# Patient Record
Sex: MALE | Race: BLACK OR AFRICAN AMERICAN
[De-identification: names, ages, dates, MRNs, and addresses within clinical notes are randomized per-mention and may not be internally consistent; named-entity substitution may affect disease eponyms.]

---

## 2019-01-10 ENCOUNTER — HOSPITAL ENCOUNTER (EMERGENCY)
Dept: HOSPITAL 74 - JER | Age: 69
Discharge: HOME | End: 2019-01-10
Payer: COMMERCIAL

## 2019-01-10 VITALS — DIASTOLIC BLOOD PRESSURE: 85 MMHG | HEART RATE: 78 BPM | TEMPERATURE: 97.9 F | SYSTOLIC BLOOD PRESSURE: 158 MMHG

## 2019-01-10 VITALS — BODY MASS INDEX: 24 KG/M2

## 2019-01-10 DIAGNOSIS — Z86.73: ICD-10-CM

## 2019-01-10 DIAGNOSIS — I10: ICD-10-CM

## 2019-01-10 DIAGNOSIS — N20.0: Primary | ICD-10-CM

## 2019-01-10 LAB
ALBUMIN SERPL-MCNC: 4.4 G/DL (ref 3.4–5)
ALP SERPL-CCNC: 126 U/L (ref 45–117)
ALT SERPL-CCNC: 19 U/L (ref 13–61)
ANION GAP SERPL CALC-SCNC: 4 MMOL/L (ref 8–16)
APPEARANCE UR: CLEAR
AST SERPL-CCNC: 19 U/L (ref 15–37)
BASOPHILS # BLD: 0.9 % (ref 0–2)
BILIRUB SERPL-MCNC: 0.3 MG/DL (ref 0.2–1)
BILIRUB UR STRIP.AUTO-MCNC: NEGATIVE MG/DL
BUN SERPL-MCNC: 12 MG/DL (ref 7–18)
CALCIUM SERPL-MCNC: 9.3 MG/DL (ref 8.5–10.1)
CHLORIDE SERPL-SCNC: 104 MMOL/L (ref 98–107)
CO2 SERPL-SCNC: 28 MMOL/L (ref 21–32)
COLOR UR: COLORLESS
CREAT SERPL-MCNC: 1.1 MG/DL (ref 0.55–1.3)
DEPRECATED RDW RBC AUTO: 17.3 % (ref 11.9–15.9)
EOSINOPHIL # BLD: 2.5 % (ref 0–4.5)
GLUCOSE SERPL-MCNC: 125 MG/DL (ref 74–106)
HCT VFR BLD CALC: 48.6 % (ref 35.4–49)
HGB BLD-MCNC: 15.4 GM/DL (ref 11.7–16.9)
KETONES UR QL STRIP: NEGATIVE
LEUKOCYTE ESTERASE UR QL STRIP.AUTO: NEGATIVE
LIPASE SERPL-CCNC: 74 U/L (ref 73–393)
LYMPHOCYTES # BLD: 11.7 % (ref 8–40)
MCH RBC QN AUTO: 27.2 PG (ref 25.7–33.7)
MCHC RBC AUTO-ENTMCNC: 31.7 G/DL (ref 32–35.9)
MCV RBC: 85.7 FL (ref 80–96)
MONOCYTES # BLD AUTO: 6.7 % (ref 3.8–10.2)
NEUTROPHILS # BLD: 78.2 % (ref 42.8–82.8)
NITRITE UR QL STRIP: NEGATIVE
PH UR: 7 [PH] (ref 5–8)
PLATELET # BLD AUTO: 248 K/MM3 (ref 134–434)
PMV BLD: 8.3 FL (ref 7.5–11.1)
POTASSIUM SERPLBLD-SCNC: 3.7 MMOL/L (ref 3.5–5.1)
PROT SERPL-MCNC: 8.6 G/DL (ref 6.4–8.2)
PROT UR QL STRIP: NEGATIVE
PROT UR QL STRIP: NEGATIVE
RBC # BLD AUTO: 5.67 M/MM3 (ref 4–5.6)
SODIUM SERPL-SCNC: 135 MMOL/L (ref 136–145)
SP GR UR: 1.03 (ref 1.01–1.03)
UROBILINOGEN UR STRIP-MCNC: NEGATIVE MG/DL (ref 0.2–1)
WBC # BLD AUTO: 12.6 K/MM3 (ref 4–10)

## 2019-01-10 PROCEDURE — 3E0337Z INTRODUCTION OF ELECTROLYTIC AND WATER BALANCE SUBSTANCE INTO PERIPHERAL VEIN, PERCUTANEOUS APPROACH: ICD-10-PCS | Performed by: EMERGENCY MEDICINE

## 2019-01-10 PROCEDURE — 3E033NZ INTRODUCTION OF ANALGESICS, HYPNOTICS, SEDATIVES INTO PERIPHERAL VEIN, PERCUTANEOUS APPROACH: ICD-10-PCS | Performed by: EMERGENCY MEDICINE

## 2019-01-10 PROCEDURE — 3E033GC INTRODUCTION OF OTHER THERAPEUTIC SUBSTANCE INTO PERIPHERAL VEIN, PERCUTANEOUS APPROACH: ICD-10-PCS | Performed by: EMERGENCY MEDICINE

## 2019-01-10 PROCEDURE — 3E0333Z INTRODUCTION OF ANTI-INFLAMMATORY INTO PERIPHERAL VEIN, PERCUTANEOUS APPROACH: ICD-10-PCS | Performed by: EMERGENCY MEDICINE

## 2019-01-10 NOTE — PDOC
History of Present Illness





- General


Stated Complaint: ABDOMINAL PAIN


Time Seen by Provider: 01/10/19 07:07





- History of Present Illness


Initial Comments: 





01/10/19 07:12


Mr. López is a 69 yo male w/ pmh of HTN, prior CVA, s/p appendectomy and 

cholecystectomy who presents for evaluation of 2-3 day history of intense 

abdominal pain. Patient reports he had hiccupping several days ago which was 

relieved by nexium OTC however he stopped taking nexium after his hiccups 

resolved. He cannot localize his pain to anywhere specific and does not endorse 

any relation to food, activity, bathroom habits, etc. Denies other symptoms at 

this time.





The patient denies chest pain, shortness of breath, headache and dizziness. 

Denies fever, chills, nausea, vomit, diarrhea and constipation. Denies dysuria, 

frequency, urgency and hematuria. 





Past History





- Past Medical History


Allergies/Adverse Reactions: 


 Allergies











Allergy/AdvReac Type Severity Reaction Status Date / Time


 


No Known Allergies Allergy   Verified 01/10/19 07:17











Home Medications: 


Ambulatory Orders





Amlodipine Besylate [Norvasc -] 10 mg PO DAILY 01/10/19 


Diclofenac Potassium 50 mg PO ASDIR 01/10/19 


Fluticasone Furoate [Children's Flonase Sensimist] 5.9 ml NS ASDIR 01/10/19 


Loratadine 10 mg PO ASDIR 01/10/19 


Omeprazole 20 mg PO ASDIR 01/10/19 


Oxybutynin Chloride [Ditropan -] 5 mg PO DAILY 01/10/19 


Selenium 25 mcg PO ASDIR 01/10/19 


Tamsulosin HCl [Flomax] 0.4 mg PO DAILY #7 cap.er.24h 01/10/19 


Tramadol HCl 50 mg PO BID #4 tablet MDD 2 01/10/19 








CVA: Yes


HTN: Yes





- Suicide/Smoking/Psychosocial Hx


Smoking History: Current every day smoker


Number of Cigarettes Smoked Daily: 20


'Breaking Loose' booklet given: 09/13/15


Hx Alcohol Use: No


Drug/Substance Use Hx: No





**Review of Systems





- Review of Systems


Comments:: 





01/10/19 07:14


GENERAL/CONSTITUTIONAL: No fever or chills. No weakness.


HEAD, EYES, EARS, NOSE AND THROAT: No change in vision. No ear pain or 

discharge. No sore throat.


CARDIOVASCULAR: No chest pain or shortness of breath


RESPIRATORY: No cough, wheezing, or hemoptysis.


GASTROINTESTINAL: +Abdominal pain as described. No nausea, vomiting, diarrhea 

or constipation.


GENITOURINARY: No dysuria, frequency, or change in urination.


MUSCULOSKELETAL: No joint or muscle swelling or pain. No neck or back pain.


SKIN: No rash


NEUROLOGIC: No headache, vertigo, loss of consciousness, or change in strength/

sensation.


ENDOCRINE: No increased thirst. No abnormal weight change


HEMATOLOGIC/LYMPHATIC: No anemia, easy bleeding, or history of blood clots.


ALLERGIC/IMMUNOLOGIC: No hives or skin allergy.





*Physical Exam





- Physical Exam


Comments: 





01/10/19 07:14


GENERAL: Awake, alert, and fully oriented, in no acute distress


HEAD: No signs of trauma, normocephalic, atraumatic 


EYES: PERRLA, EOMI, sclera anicteric, conjunctiva clear


ENT: Auricles normal inspection, hearing grossly normal, nares patent, 

oropharynx clear without


exudates. Moist mucosa


NECK: Normal ROM, supple, no lymphadenopathy, JVD, or masses


LUNGS: No distress, speaks full sentences, clear to auscultation bilaterally 


HEART: Regular rate and rhythm, normal S1 and S2, no murmurs, rubs or gallops, 

peripheral pulses normal and equal bilaterally. 


ABDOMEN: +Diffuse abdominal TTP. Patient guarding. Normoactive bowel sounds. No 

rebound. No masses.


EXTREMITIES: Normal inspection, Normal range of motion, no edema. No clubbing 

or cyanosis. 


NEUROLOGICAL: Cranial nerves II through XII grossly intact. Normal speech, 

normal gait, no focal sensorimotor deficits 


SKIN: Warm, Dry, normal turgor, no rashes or lesions noted. 








ED Treatment Course





- LABORATORY


CBC & Chemistry Diagram: 


 01/10/19 08:10





 01/10/19 08:10





Medical Decision Making





- Medical Decision Making





01/10/19 10:12


Mr. López is a 69 yo male w/ pmh as described who presents for evaluation of 

diffuse abdominal pain. Patient evaluated using labs as well as CT abdomen/

pelvis using IV/Oral contrast. Patient noted to have partially obstructing mid 

right ureteral calculus with mild hydronephrosis, also heterogeneous pancreatic 

masses suspicious for malignancy.





01/10/19 11:46


Discussed CT with patient - pancreatic mass known and followed by [facility] physician 

at the [facility]. Patient pain improved after toradol. Discharging patient for 

outpatient treatment of kidney stone and urology follow-up. Flomax Rx sent to 

patient's pharmacy. Patient will contact urologist through [facility] after discharge. 





 Laboratory Results - last 24 hr











  01/10/19 01/10/19 01/10/19





  08:10 08:10 08:10


 


WBC  12.6 H  


 


RBC  5.67 H  


 


Hgb  15.4  


 


Hct  48.6  


 


MCV  85.7  


 


MCH  27.2  


 


MCHC  31.7 L  


 


RDW  17.3 H  


 


Plt Count  248  D  


 


MPV  8.3  


 


Absolute Neuts (auto)  9.8 H  


 


Neutrophils %  78.2  D  


 


Lymphocytes %  11.7  D  


 


Monocytes %  6.7  


 


Eosinophils %  2.5  


 


Basophils %  0.9  


 


Nucleated RBC %  0  


 


Platelet Comment  No clumping noted  


 


Sodium   135 L 


 


Potassium   3.7 


 


Chloride   104 


 


Carbon Dioxide   28 


 


Anion Gap   4 L 


 


BUN   12 


 


Creatinine   1.1 


 


Creat Clearance w eGFR   > 60 


 


Random Glucose   125 H 


 


Lactic Acid    1.3


 


Calcium   9.3 


 


Total Bilirubin   0.3 


 


AST   19 


 


ALT   19 


 


Alkaline Phosphatase   126 H 


 


Total Protein   8.6 H 


 


Albumin   4.4 


 


Lipase   74 


 


Urine Color   


 


Urine Appearance   


 


Urine pH   


 


Ur Specific Gravity   


 


Urine Protein   


 


Urine Glucose (UA)   


 


Urine Ketones   


 


Urine Blood   


 


Urine Nitrite   


 


Urine Bilirubin   


 


Urine Urobilinogen   


 


Ur Leukocyte Esterase   


 


Urine WBC (Auto)   


 


Urine RBC (Auto)   














  01/10/19





  10:34


 


WBC 


 


RBC 


 


Hgb 


 


Hct 


 


MCV 


 


MCH 


 


MCHC 


 


RDW 


 


Plt Count 


 


MPV 


 


Absolute Neuts (auto) 


 


Neutrophils % 


 


Lymphocytes % 


 


Monocytes % 


 


Eosinophils % 


 


Basophils % 


 


Nucleated RBC % 


 


Platelet Comment 


 


Sodium 


 


Potassium 


 


Chloride 


 


Carbon Dioxide 


 


Anion Gap 


 


BUN 


 


Creatinine 


 


Creat Clearance w eGFR 


 


Random Glucose 


 


Lactic Acid 


 


Calcium 


 


Total Bilirubin 


 


AST 


 


ALT 


 


Alkaline Phosphatase 


 


Total Protein 


 


Albumin 


 


Lipase 


 


Urine Color  Colorless


 


Urine Appearance  Clear


 


Urine pH  7.0  D


 


Ur Specific Gravity  1.028


 


Urine Protein  Negative


 


Urine Glucose (UA)  Negative


 


Urine Ketones  Negative


 


Urine Blood  1+ H


 


Urine Nitrite  Negative


 


Urine Bilirubin  Negative


 


Urine Urobilinogen  Negative


 


Ur Leukocyte Esterase  Negative


 


Urine WBC (Auto)  2


 


Urine RBC (Auto)  2














*DC/Admit/Observation/Transfer


Diagnosis at time of Disposition: 


 Kidney stone








- Discharge Dispostion


Disposition: HOME


Condition at time of disposition: Fair





- Prescriptions


Prescriptions: 


Tamsulosin HCl [Flomax] 0.4 mg PO DAILY #7 cap.er.24h





- Referrals


Referrals: 


Soco Lyons MD [Primary Care Provider] - 





- Patient Instructions


Printed Discharge Instructions:  [facility] for Kidney Stones


Additional Instructions: 


You were evaluated today in the emergency room for your pain and found to have 

a kidney stone. A proscription was sent to your pharmacy. Please take all 

medications as proscribed. No other concerning findings were found at this 

time. Please follow-up with urology as discussed. Return to ER if any fever, 

chills, increase in pain, or other concerning symptoms.





- Post Discharge Activity

## 2019-01-10 NOTE — PDOC
Attending Attestation





- HPI


HPI: 





01/10/19 09:23


The patient is a 68 year old male, with a significant PMH of hypertension, 

prior CVA, who presents to the emergency department with 2-3 days of 

generalized abdominal pain. The patient states he is unable to localize the 

abdominal pain and denies any exacerbating or remitting factors. The patient 

also notes he has had hiccups for several days for which he took OTC Nexium 

with relief of his hiccups. 





The patient denies chest pain, shortness of breath, headache and dizziness.


Denies fever, chills, nausea, vomit, diarrhea and constipation.


Denies dysuria, frequency, urgency and hematuria.





Allergies: NKA


Past surgical history: appendectomy and cholecystectomy 


Social history: No reported


PCP: Dr Soco Lyons 





Documentation prepared by Taiwo Price, acting as medical scribe for Grayson Waters MD.





- Physicial Exam


PE: 





01/10/19 11:12


Vitals: Triage Vital signs reviewed


General Appearance:  no acute distress, well nourished well developed,


Neck:  Supple;No Nuchal rigidity


Chest Wall: Nontender


Cardiac: Regular rate and rhythm, no murmurs, no rubs, no gallops,


Lungs: Clear to auscultation bilateral, good air movement bilaterally,


Abdomen:  (+) Diffuse abdominal pain. No rebound or guarding. Soft, nondistended

, normal bowel sounds.


Rectal: Exam deferred


Extremities: Full range of motion to all extremities, no cyanosis, clubbing, or 

edema


Skin:  Warm and dry, no rashes or lesions, no petechiae


Psych:  normal mood, normal affect








<Taiwo Price - Last Filed: 01/10/19 11:12>





- Resident


Resident Name: Donald Aldridge





- ED Attending Attestation


I have performed the following: I have examined & evaluated the patient, The 

case was reviewed & discussed with the resident, I agree w/resident's findings 

& plan, Exceptions are as noted





- Medical Decision Making





01/10/19 14:30





CT with evidence of right-sided kidney stone





Status post Toradol patient feels much better





Urinalysis clean labs notable for slightly elevated leukocytosis below 

suspicion for infected stone at this time





Patient has urologist we'll discharge her on Aleve tramadol Flomax with urology 

follow-up





Findings, need for follow-up and strict return instructions discussed with 

patient.








<Grayson Waters - Last Filed: 01/10/19 14:30>

## 2019-01-12 ENCOUNTER — HOSPITAL ENCOUNTER (INPATIENT)
Dept: HOSPITAL 74 - JER | Age: 69
LOS: 12 days | Discharge: HOME | DRG: 660 | End: 2019-01-24
Attending: INTERNAL MEDICINE | Admitting: INTERNAL MEDICINE
Payer: COMMERCIAL

## 2019-01-12 VITALS — BODY MASS INDEX: 24.3 KG/M2

## 2019-01-12 DIAGNOSIS — I10: ICD-10-CM

## 2019-01-12 DIAGNOSIS — N17.9: ICD-10-CM

## 2019-01-12 DIAGNOSIS — R78.81: ICD-10-CM

## 2019-01-12 DIAGNOSIS — Z86.73: ICD-10-CM

## 2019-01-12 DIAGNOSIS — K86.89: ICD-10-CM

## 2019-01-12 DIAGNOSIS — N28.1: ICD-10-CM

## 2019-01-12 DIAGNOSIS — N13.2: Primary | ICD-10-CM

## 2019-01-12 DIAGNOSIS — B95.7: ICD-10-CM

## 2019-01-12 DIAGNOSIS — N39.0: ICD-10-CM

## 2019-01-12 DIAGNOSIS — K59.00: ICD-10-CM

## 2019-01-12 LAB
ALBUMIN SERPL-MCNC: 2.9 G/DL (ref 3.4–5)
ALP SERPL-CCNC: 95 U/L (ref 45–117)
ALT SERPL-CCNC: 28 U/L (ref 13–61)
AMYLASE SERPL-CCNC: 63 U/L (ref 25–115)
ANION GAP SERPL CALC-SCNC: 12 MMOL/L (ref 8–16)
APPEARANCE UR: (no result)
AST SERPL-CCNC: 45 U/L (ref 15–37)
BACTERIA #/AREA URNS HPF: (no result) /HPF
BASOPHILS # BLD: 0.3 % (ref 0–2)
BILIRUB SERPL-MCNC: 0.9 MG/DL (ref 0.2–1)
BILIRUB UR STRIP.AUTO-MCNC: NEGATIVE MG/DL
BUN SERPL-MCNC: 28 MG/DL (ref 7–18)
CALCIUM SERPL-MCNC: 8.1 MG/DL (ref 8.5–10.1)
CHLORIDE SERPL-SCNC: 106 MMOL/L (ref 98–107)
CO2 SERPL-SCNC: 20 MMOL/L (ref 21–32)
COLOR UR: YELLOW
CREAT SERPL-MCNC: 2 MG/DL (ref 0.55–1.3)
DEPRECATED RDW RBC AUTO: 17.3 % (ref 11.9–15.9)
EOSINOPHIL # BLD: 0.1 % (ref 0–4.5)
EPITH CASTS URNS QL MICRO: (no result) /HPF
GLUCOSE SERPL-MCNC: 147 MG/DL (ref 74–106)
HCT VFR BLD CALC: 43.9 % (ref 35.4–49)
HGB BLD-MCNC: 15.3 GM/DL (ref 11.7–16.9)
INR BLD: 1.38 (ref 0.83–1.09)
KETONES UR QL STRIP: (no result)
LEUKOCYTE ESTERASE UR QL STRIP.AUTO: (no result)
LIPASE SERPL-CCNC: 36 U/L (ref 73–393)
LYMPHOCYTES # BLD: 2.2 % (ref 8–40)
MCH RBC QN AUTO: 29.2 PG (ref 25.7–33.7)
MCHC RBC AUTO-ENTMCNC: 34.8 G/DL (ref 32–35.9)
MCV RBC: 84 FL (ref 80–96)
MONOCYTES # BLD AUTO: 4.2 % (ref 3.8–10.2)
MUCOUS THREADS URNS QL MICRO: (no result)
NEUTROPHILS # BLD: 93.2 % (ref 42.8–82.8)
NITRITE UR QL STRIP: NEGATIVE
PH UR: 5 [PH] (ref 5–8)
PLATELET # BLD AUTO: 156 K/MM3 (ref 134–434)
PLATELET BLD QL SMEAR: ADEQUATE
PMV BLD: 8.8 FL (ref 7.5–11.1)
POTASSIUM SERPLBLD-SCNC: 4 MMOL/L (ref 3.5–5.1)
PROT SERPL-MCNC: 6.1 G/DL (ref 6.4–8.2)
PROT UR QL STRIP: (no result)
PROT UR QL STRIP: NEGATIVE
PT PNL PPP: 16.3 SEC (ref 9.7–13)
RBC # BLD AUTO: 5.23 M/MM3 (ref 4–5.6)
SODIUM SERPL-SCNC: 138 MMOL/L (ref 136–145)
SP GR UR: 1.02 (ref 1.01–1.03)
UROBILINOGEN UR STRIP-MCNC: 2 MG/DL (ref 0.2–1)
WBC # BLD AUTO: 17.3 K/MM3 (ref 4–10)

## 2019-01-12 PROCEDURE — G0480 DRUG TEST DEF 1-7 CLASSES: HCPCS

## 2019-01-12 PROCEDURE — C1751 CATH, INF, PER/CENT/MIDLINE: HCPCS

## 2019-01-12 NOTE — PDOC
Attending Attestation





- HPI


HPI: 





01/12/19 16:40


The patient is a 68 year old male with a significant past medical history of 

hypertension and CVA who presents to the emergency department altered mental 

since earlier today. The patient was seen in the ED 1 day ago for abdominal 

pain by which he was diagnosed with a right sided stone and sent home with 

medication. The patient reports worsened pain today and a fever. As per family 

at bedside, the patient did not know what article of clothing he had and he did 

not know his birthday. Denies any other symptoms or complaints. 





Documentation prepared by Kacy Carty, acting as medical scribe for Elvia Golden MD





- Physicial Exam


PE: 





01/12/19 17:01


GENERAL:(+)confused. The patient is in no acute distress.


HEAD: Normal with no signs of trauma.


EYES: PERRLA, EOMI, sclera anicteric, conjunctiva clear.


ENT: Ears normal, nares patent, oropharynx clear without exudates.


Moist mucous membranes.


NECK: Normal range of motion, supple without lymphadenopathy, JVD, or masses.


LUNGS: Breath sounds equal, clear to auscultation bilaterally. No


wheezes, and no crackles.


HEART:(+)tachy. Regular rhythm, normal S1 and S2 without murmur, rub or gallop.


ABDOMEN:(+)right sided abdominal tenderness.  Soft, nondistended. normoactive 

bowel sounds. No guarding, no


rebound. No masses palpable.


EXTREMITIES: Normal range of motion, no edema. No clubbing or


cyanosis. No erythema, or tenderness.


NEUROLOGICAL: Cranial nerves II through XII grossly intact. Normal


speech. No focal neurological deficits.


MUSCULOSKELETAL: Back non-tender to palpation, no CVA tenderness


SKIN: Warm, Dry, normal turgor, no rashes or lesions noted.








<Kacy Carty - Last Filed: 01/12/19 17:01>





- Resident


Resident Name: Lena Fry





- ED Attending Attestation


I have performed the following: I have examined & evaluated the patient, The 

case was reviewed & discussed with the resident, I agree w/resident's findings 

& plan, Exceptions are as noted





- Medical Decision Making





01/12/19 16:39


EKG - NSR rate of 132 bpm, axis nml, no st elevations or depressions, intervals 

nml, t waves upright





01/12/19 17:05


Rectal temp 105.6


Sepsis order set initiated


Fluids ordered per sepsis order set 





Labs pending 


UA pending


Will admit


Will consult Dr Chavez





01/12/19 17:17


Case reviewed with Dr Chavez


He requests imipenam


call placed to Dr. Leigha zambrano


His phone is off


Will contact the service again





Will also consult 








01/12/19 17:22


 Laboratory Tests











  01/10/19 01/12/19





  08:10 16:30


 


WBC  12.6 H  17.3 H


 


Hgb  15.4  15.3


 


Hct  48.6  43.9


 


Plt Count  248  D  156  D











01/12/19 18:38


 Laboratory Tests











  01/12/19





  16:30


 


Lactic Acid  2.8 H*











01/12/19 19:52


 Laboratory Tests











  01/10/19 01/12/19 01/12/19





  08:10 16:30 17:56


 


Sodium  135 L  Cancelled  138


 


Potassium  3.7   4.0


 


Chloride  104   106


 


Carbon Dioxide  28   20 L


 


Anion Gap    12


 


BUN  12   28 H


 


Creatinine  1.1  Cancelled  2.0 H


 


Random Glucose  125 H   147 H


 


Creatine Kinase    231


 


Troponin I    < 0.02


 


Total Amylase    63


 


Lipase    36 L








Jeronimo scott





Case reviewed with Dr Jeronimo Mcnulty


He will be taking this patient to the OR tomorrow


Pt briefly became hypotensive


Receiving sepsis bolus 


Call placed to ICU 


They have evaluated this patient


At this time, he is not a candidate for ICU at this time





Clinical impression:


OBSTRUCTING STONE


UTI





<Elvia Golden - Last Filed: 01/12/19 21:34>

## 2019-01-12 NOTE — PDOC
History of Present Illness





- General


Chief Complaint: Altered Mental Status


Stated Complaint: KIDNEY PAIN


Time Seen by Provider: 01/12/19 15:54


History Source: Patient





- History of Present Illness


Initial Comments: 





01/12/19 17:03


The patient is 68 year old male with a PMH of HTN, remote h/o CVA (20+ years 

previous w/no residual deficits), pancreatic mass (s/p biopsy [facility], no known 

malignancy) presents to our ED c/o fever, abdominal pain.  Patient's wife @ 

bedside assists in history. Patient states he was evaluated yesterday in our ED 

for abdominal pain and CT showed kidney stone.  Was discharged home with 

supportive care.  Pain worsened overnight and today.  Patient's wife noticed he 

felt warm to touch and decided to come to the ED for further evaluation.  

Patient states the pain is diffuse, R>L, 10/10, "sharp," radiates to his R 

flank.  Minimal relief from Tramadol which was prescribed from ED evaluation 

yesterday.  Is tolerating PO intake, however notes decreased appetite 2/2 to 

symptoms. Last BM was prior to presentation and was normal.  Patient's wife 

notes that patient couldn't remember his birthdate @ triage and was confused @ 

home looking for his hat while it was on his head.  At baseline patient is alert

, oriented x3.  





10 ROS was negative including no chest pain/shortness of breath/abdominal pain/

nausea/vomiting/diarrhea/constipation.





NKDA


Surgical: appendectomy, cholecystectomy


Social: lifetime non-smoker


PMD[facility] Soco [facility] 








As per EMR, patient was evaluated in our ED on 1/10 @ which time CT w/IV 

contrast showed 5x7 mm R mid ureter calculus, partial obstruction, mild R sided 

hydronephrosis, heterogenous pancreatic mass w/duct dilatation.  Cr stable @ 1.1

, UA showed 1+ blood c/w nephrolithiasis, no LE, normal WBC.  

















Past History





- Past Medical History


Allergies/Adverse Reactions: 


 Allergies











Allergy/AdvReac Type Severity Reaction Status Date / Time


 


No Known Allergies Allergy   Verified 01/10/19 07:17











Home Medications: 


Ambulatory Orders





Amlodipine Besylate [Norvasc -] 10 mg PO DAILY 01/10/19 


Diclofenac Potassium 50 mg PO ASDIR 01/10/19 


Fluticasone Furoate [Children's Flonase Sensimist] 5.9 ml NS ASDIR 01/10/19 


Loratadine 10 mg PO ASDIR 01/10/19 


Omeprazole 20 mg PO ASDIR 01/10/19 


Oxybutynin Chloride [Ditropan -] 5 mg PO DAILY 01/10/19 


Selenium 25 mcg PO ASDIR 01/10/19 


Tamsulosin HCl [Flomax] 0.4 mg PO DAILY #7 cap.er.24h 01/10/19 


Tramadol HCl 50 mg PO BID #4 tablet MDD 2 01/10/19 








CVA: Yes


COPD: No


HTN: Yes





- Immunization History


Immunization Up to Date: Yes





- Suicide/Smoking/Psychosocial Hx


Smoking History: Current every day smoker


Have you smoked in the past 12 months: No


Number of Cigarettes Smoked Daily: 10


Information on smoking cessation initiated: No


'Breaking Loose' booklet given: 09/13/15


Hx Alcohol Use: No


Drug/Substance Use Hx: No





**Review of Systems





- Review of Systems


Constitutional: Yes: Chills, Fever


HEENTM: No: Recent change in vision


Respiratory: No: Cough, Shortness of Breath


Cardiac (ROS): No: Chest Pain, Lightheadedness, Palpitations, Syncope


ABD/GI: Yes: Abdominal cramping.  No: Constipated, Diarrhea, Nausea, Vomiting


: No: Burning, Dysuria





*Physical Exam





- Vital Signs


 Last Vital Signs











Temp Pulse Resp BP Pulse Ox


 


 100.2 F H  147 H  20   144/95   98 


 


 01/12/19 15:47  01/12/19 15:47  01/12/19 15:47  01/12/19 15:47  01/12/19 15:47














- Physical Exam


General Appearance: Yes: Nourished, Appropriately Dressed


HEENT: positive: Normal Voice, Hearing Grossly Normal


Neck: positive: Trachea midline, Supple


Respiratory/Chest: positive: Lungs Clear, Normal Breath Sounds


Cardiovascular: positive: S1, S2, Tachycardia


Vascular Pulses: Dorsalis-Pedis (R): 2+, Doralis-Pedis (L): 2+


Gastrointestinal/Abdominal: positive: Soft, Other (Mild RUQ TTP, (+) bowel 

sounds, no peritoneal signs)


Male Genitalia: positive: CVAT (R side)


Extremity: positive: Normal Capillary Refill, Normal Inspection


Integumentary: positive: Normal Color, Dry, Warm


Neurologic: positive: Fully Oriented, Alert





Moderate Sedation





- Procedure Monitoring


Vital Signs: 


Procedure Monitoring Vital Signs











Temperature  100.2 F H  01/12/19 15:47


 


Pulse Rate  147 H  01/12/19 15:47


 


Respiratory Rate  20   01/12/19 15:47


 


Blood Pressure  144/95   01/12/19 15:47


 


O2 Sat by Pulse Oximetry (%)  98   01/12/19 15:47











ED Treatment Course





- LABORATORY


CBC & Chemistry Diagram: 


 01/13/19 09:30





 01/13/19 09:30





Medical Decision Making





- Medical Decision Making





01/12/19 17:08


68 year old male with abdominal pain, H/o 5x7 R sided obstructing stone 

yesterday.  Febrile (105) and Tachycardic (140's) @ triage.  Repeat VS @ bedside

: Temp 104.2 (Rectal), , RR 21, SpO2 97% on RA. A&O x3.  Frontal diagnosis

: sepsis 2/2 to obstructing stone, cystitis/pyelonephritis, ARF. Also consider 

pancreatic malignancy.  ED Adult Sepsis initiated. Tylenol for fever, Morphine 

for pain.  Patient likely needs IV antibiotics.  Head CT to r/o CVA/TIA, though 

patient's confusion likely 2/2 to SiSx.  Close monitoring.








01/12/19 17:15


Case d/w Dr. Chavez (ID) Imipenem broad spectrum coverage.  





01/12/19 17:26


CBC shows Leukocytosis 17.3 (12 on 01/10)





01/12/19 17:27


Contacted Dr. Ahuja (covering for patient's PMD Dr. Soco Lyons), phone 

directly to voicemail


Paged Dr. Pastor - as per answering service covering for Dr. Ahuja; 

requests voicemail for Dr. Ahuja prior to accepting admission.


Voicemail for Dr. Ahuja.





01/12/19 17:35


CMP hemolyzed





01/12/19 17:42


Call placed to Dr. Pastor, accepts patient for admission; requests 

pancreatic enzymes, update on Head CT





01/12/19 18:07


Head CT negative


Patient reassessed @ bedside, pain improved w/Morphine, remains A&O x4


Tachycardia Improving (120's)


Repeat /74





01/12/19 18:38


Lactic Acid 2.8


Cr 2.0 (Cr yesterday 1.1)


Lipase low (36), Amylase wnL


Sepsis dose IV fluids hanging (2327 mL as per patient weight, patient given 1 L 

bolus and additional 1327 mL)





01/12/19 19:43


Repeat /75





01/12/19 20:32


Case d/w Dr. Lentz (urology)


Will evaluate patient in the a.m. for possible stenting tomorrow


Patient NPO @ midnight


Reassessed @ bedside


VS: SpO2 96%, HR 85, BP 95/75 


Will give additional 1 L; withhold any additional Morphine for pain given 

patient's hypotension


Will contact ICU for possible unit admission


Patient and patient's wife counseled on plan of care including possible OR 

tomorrow





01/12/19 20:34


Case d/w ICU @ bedside.


Agrees with floor admission; will contact if worsening VS.





01/12/19 20:36


Repeat /74


Tylenol for pain





01/12/19 20:54


Patient transferred to inpatient medicine floor





Clinical Impression: Nephrolithiasis w/superimposed infection 2/2 to 

obstructing stone














*DC/Admit/Observation/Transfer


Diagnosis at time of Disposition: 


 Fever, Nephrolithiasis








- Discharge Dispostion


Condition at time of disposition: Fair


Decision to Admit order: Yes





- Referrals





- Patient Instructions





- Post Discharge Activity

## 2019-01-13 LAB
ALBUMIN SERPL-MCNC: 2.7 G/DL (ref 3.4–5)
ALP SERPL-CCNC: 90 U/L (ref 45–117)
ALT SERPL-CCNC: 28 U/L (ref 13–61)
ANION GAP SERPL CALC-SCNC: 8 MMOL/L (ref 8–16)
AST SERPL-CCNC: 39 U/L (ref 15–37)
BASOPHILS # BLD: 0.2 % (ref 0–2)
BILIRUB SERPL-MCNC: 1.1 MG/DL (ref 0.2–1)
BUN SERPL-MCNC: 20 MG/DL (ref 7–18)
CALCIUM SERPL-MCNC: 7.9 MG/DL (ref 8.5–10.1)
CHLORIDE SERPL-SCNC: 112 MMOL/L (ref 98–107)
CO2 SERPL-SCNC: 22 MMOL/L (ref 21–32)
CREAT SERPL-MCNC: 1.6 MG/DL (ref 0.55–1.3)
DEPRECATED RDW RBC AUTO: 17 % (ref 11.9–15.9)
EOSINOPHIL # BLD: 0.2 % (ref 0–4.5)
GLUCOSE SERPL-MCNC: 130 MG/DL (ref 74–106)
HCT VFR BLD CALC: 37.9 % (ref 35.4–49)
HGB BLD-MCNC: 13.2 GM/DL (ref 11.7–16.9)
LYMPHOCYTES # BLD: 4.8 % (ref 8–40)
MCH RBC QN AUTO: 29.1 PG (ref 25.7–33.7)
MCHC RBC AUTO-ENTMCNC: 34.7 G/DL (ref 32–35.9)
MCV RBC: 83.9 FL (ref 80–96)
MONOCYTES # BLD AUTO: 5.6 % (ref 3.8–10.2)
NEUTROPHILS # BLD: 89.2 % (ref 42.8–82.8)
PLATELET # BLD AUTO: 142 K/MM3 (ref 134–434)
PMV BLD: 8.9 FL (ref 7.5–11.1)
POTASSIUM SERPLBLD-SCNC: 3.8 MMOL/L (ref 3.5–5.1)
PROT SERPL-MCNC: 5.6 G/DL (ref 6.4–8.2)
RBC # BLD AUTO: 4.52 M/MM3 (ref 4–5.6)
SODIUM SERPL-SCNC: 142 MMOL/L (ref 136–145)
WBC # BLD AUTO: 14.6 K/MM3 (ref 4–10)

## 2019-01-13 RX ADMIN — MORPHINE SULFATE PRN MG: 2 INJECTION, SOLUTION INTRAMUSCULAR; INTRAVENOUS at 01:39

## 2019-01-13 RX ADMIN — DEXTROSE AND SODIUM CHLORIDE SCH MLS/HR: 5; 450 INJECTION, SOLUTION INTRAVENOUS at 01:38

## 2019-01-13 RX ADMIN — IMIPENEM AND CILASTATIN SODIUM SCH MLS/HR: 500; 500 INJECTION, POWDER, FOR SOLUTION INTRAVENOUS at 17:51

## 2019-01-13 RX ADMIN — IMIPENEM AND CILASTATIN SODIUM SCH MLS/HR: 500; 500 INJECTION, POWDER, FOR SOLUTION INTRAVENOUS at 02:43

## 2019-01-13 RX ADMIN — IMIPENEM AND CILASTATIN SODIUM SCH MLS/HR: 500; 500 INJECTION, POWDER, FOR SOLUTION INTRAVENOUS at 10:16

## 2019-01-13 NOTE — PN
Progress Note, Physician


Chief Complaint: 





pt lyin bed,pt feels better


afebrile


Vitals stable


Pt is on IV fluid,antibiotics and pain meds


ID and urology f/u


Possible stent placement today


CT scan of abdomen report shows 5x7 mm partially obstructing rt ureteral stone 

with mild hydronephrosid ,heterogenous mass in the body of pancreas with 

dialatation of pancreatic duct distal to mass





- Current Medication List


Current Medications: 


Active Medications





Acetaminophen (Tylenol -)  650 mg PO Q6H PRN


   PRN Reason: FEVER


Imipenem/Cilastatin Sodium 500 (mg/ Sodium Chloride)  100 mls @ 100 mls/hr IVPB 

Q8H-IV NAVYA; Protocol


   Last Admin: 01/13/19 02:43 Dose:  100 mls/hr


Dextrose/Sodium Chloride (D5-1/2ns -)  1,000 mls @ 50 mls/hr IV ASDIR NAVYA


   Last Admin: 01/13/19 01:38 Dose:  50 mls/hr


Morphine Sulfate (Morphine Sulfate)  2 mg IVPUSH Q6H PRN


   PRN Reason: PAIN LEVEL 5-8


   Last Admin: 01/13/19 01:39 Dose:  2 mg











- Objective


Vital Signs: 


 Vital Signs











Temperature  99.7 F H  01/13/19 06:00


 


Pulse Rate  87   01/13/19 06:00


 


Respiratory Rate  20   01/13/19 06:00


 


Blood Pressure  108/69   01/13/19 06:00


 


O2 Sat by Pulse Oximetry (%)  98   01/13/19 00:56











Constitutional: Yes: No Distress


Eyes: Yes: Conjunctiva Clear


HENT: Yes: Atraumatic


Neck: Yes: Supple, Trachea Midline


Cardiovascular: Yes: Regular Rate and Rhythm


Respiratory: Yes: Regular, CTA Bilaterally


Gastrointestinal: Yes: Normal Bowel Sounds, Soft


Genitourinary: Yes: Other (no cva tenderness)


Musculoskeletal: Yes: WNL


Extremities: Yes: WNL


Edema: No


Peripheral Pulses WNL: Yes


Neurological: Yes: WNL, Alert


...Motor Strength: WNL


Psychiatric: Yes: WNL, Alert


Labs: 


 CBC, BMP





 01/12/19 16:30 





 01/12/19 17:56 





 INR, PTT











INR  1.38  (0.83-1.09)  H  01/12/19  16:30    














- ....Imaging


Chest X-ray: Report Reviewed


Cat Scan: Report Reviewed


EKG: Report Reviewed





Assessment/Plan





 Rt ureteric stone ,fever,UTI


Leucocytosis


Pancreatic mass


HTN


PLAN


 continue antibiotics,IV fluid and pain MEDS


UROLOGY f/u


ID f/u


Will monitor CBC and BUN and CREatinine


For possible Stent Placement today

## 2019-01-13 NOTE — CON.ID
Consult


Consult Specialty:: infectious diseases


Referred by:: 


Reason for Consultation:: sepsis,fever





- History of Present Illness


Chief Complaint: abd pain


History of Present Illness: 





68 year old male with a PMH of HTN, remote h/o CVA (20+ years previous w/no 

residual deficits), pancreatic mass (s/p biopsy @ [facility], no known malignancy) 

presents to our ED c/o fever, abdominal pain.  Patient's wife @ bedside assists 

in history. Patient states he was evaluated yesterday in our ED for abdominal 

pain and CT showed kidney stone.  Was discharged home with supportive care.  

Pain worsened overnight and today.  Patient's wife noticed he felt warm to 

touch and decided to come to the ED for further evaluation.  Patient states the 

pain is diffuse, R>L, 10/10, "sharp," radiates to his R flank.  Minimal relief 

from Tramadol which was prescribed from ED evaluation yesterday.  Is tolerating 

PO intake, however notes decreased appetite 2/2 to symptoms. Last BM was prior 

to presentation and was normal.  Patient's wife notes that patient couldn't 

remember his birthdate @ triage and was confused @ home looking for his hat 

while it was on his head.  At baseline patient is alert, oriented x3.


plan is to be seen by the urology





- History Source


History Provided By: Patient, Family Member


Limitations to Obtaining History: No Limitations





- Alcohol/Substance Use


Hx Alcohol Use: No





- Smoking History


Smoking history: Current every day smoker


Have you smoked in the past 12 months: No


Aproximately how many cigarettes per day: 10





Home Medications





- Allergies


Allergies/Adverse Reactions: 


 Allergies











Allergy/AdvReac Type Severity Reaction Status Date / Time


 


No Known Allergies Allergy   Verified 01/10/19 07:17














- Home Medications


Home Medications: 


Ambulatory Orders





Amlodipine Besylate [Norvasc -] 10 mg PO DAILY 01/10/19 


Diclofenac Potassium 50 mg PO ASDIR 01/10/19 


Fluticasone Furoate [Children's Flonase Sensimist] 5.9 ml NS ASDIR 01/10/19 


Loratadine 10 mg PO ASDIR 01/10/19 


Omeprazole 20 mg PO ASDIR 01/10/19 


Oxybutynin Chloride [Ditropan -] 5 mg PO DAILY 01/10/19 


Selenium 25 mcg PO ASDIR 01/10/19 


Tamsulosin HCl [Flomax] 0.4 mg PO DAILY #7 cap.er.24h 01/10/19 


Tramadol HCl 50 mg PO BID #4 tablet MDD 2 01/10/19 











Review of Systems





- Review of Systems


Constitutional: reports: Fever, Weakness


Eyes: reports: No Symptoms


HENT: reports: No Symptoms


Neck: reports: No Symptoms


Cardiovascular: reports: No Symptoms


Respiratory: reports: No Symptoms


Gastrointestinal: reports: No Symptoms


Genitourinary: reports: Dysuria, Flank Pain, Pain


Musculoskeletal: reports: No Symptoms


Integumentary: reports: No Symptoms


Neurological: reports: No Symptoms


Endocrine: reports: No Symptoms


Hematology/Lymphatic: reports: No Symptoms


Psychiatric: reports: No Symptoms





Physical Exam


Vital Signs: 


 Vital Signs











Temperature  97.9 F   01/13/19 10:00


 


Pulse Rate  88   01/13/19 10:00


 


Respiratory Rate  18   01/13/19 10:00


 


Blood Pressure  133/55 L  01/13/19 10:00


 


O2 Sat by Pulse Oximetry (%)  98   01/13/19 00:56











Constitutional: Yes: Well Nourished, Calm, Mild Distress


Eyes: Yes: Conjunctiva Clear


Neck: Yes: Supple, Trachea Midline


Cardiovascular: Yes: Regular Rate and Rhythm


Respiratory: Yes: Regular, CTA Bilaterally


Gastrointestinal: Yes: Normal Bowel Sounds, Soft


Renal/: Yes: CVA Tenderness - Right, Other


Musculoskeletal: Yes: WNL


Extremities: Yes: WNL


Neurological: Yes: Alert, Oriented


Psychiatric: Yes: Alert, Oriented


Labs: 


 CBC, BMP





 01/13/19 09:30 





 01/13/19 09:30 











Imaging





- Results


Chest X-ray: Report Reviewed, Image Reviewed


X-ray: Report Reviewed, Image Reviewed


Cat Scan: Report Reviewed, Image Reviewed





Assessment/Plan





uti


renal stone


gm positive bactermia


pancreatic mass


rt pyelo








plan


will start patient on zosyn


await for all cx reports


urology to see the patinet


also gi





patient has [facility]hd biopsy of the [facility] which seems it was negative

## 2019-01-13 NOTE — HP
DATE OF ADMISSION:  01/12/2019

 

The patient is a 68-year-old male with a past medical history of hypertension,

history of CVA in the past, with no residual deficit, and pancreatic mass, was sent

to emergency room with complaints of fever, abdominal pain.  As per the patient's

wife, the patient was seen the day before yesterday in the emergency room for

abdominal pain, and the patient had a CT of the abdomen.  It showed a small kidney

stone, and the lab work were fine, so patient discharged home on Flomax and home

medication and recommended to follow.  After the discharge, patient continued to

complain of pain and patient's wife noticed he feels warm to touch and he behaved a

little abnormal, so she brought the patient to the emergency room for further

evaluation.  Patient states the pain is diffuse in the abdomen, right side more than

the left side, 10/10, with minimal relief from tramadol, which was prescribed from

the emergency room.  Patient tolerating p.o. intake; however, he noticed a lack of

appetite for a few days.  Bowel movements were normal.  No chest pain, no headache,

no palpitation.  No nausea, no vomiting, no diarrhea.

 

SURGICAL HISTORY:  Nothing significant.  

 

No known drug allergy.

 

MEDICATION:  Patient is on amlodipine 10 mg daily, diclofenac sodium, loratadine 10

mg, omeprazole 20 mg, Ditropan 5 mg daily, Flomax 0.4 mg p.o. daily, and tramadol 50

mg p.o. b.i.d. 

 

PAST MEDICAL HISTORY:  History of CVA, hypertension, and pancreatic mass (as per the

patient, he had a biopsy done at [facility] 4 months ago and was told the mass was

benign, and he is following in [facility]).

 

PERSONAL HISTORY:  Every-day smoker.  No history of alcohol.

 

In the emergency room, temperature was 100.2, pulse 147, respiration 20, blood

pressure 144/95, saturation 98.

 

Regarding the labs, CBC WBC 17.3, on January 10, the WBC was 12.6, and hemoglobin

15.3, hematocrit 43.9, platelets 156.  Comprehensive panel:  Sodium 138, potassium 4,

chloride 106, bicarbonate 20, BUN 28, creatinine 2.  On January 1, the BUN was 12 and

creatinine 1.1.  Blood sugar 147, lactic acid was 2.8, neutrophils 93.2, PT was 16.3,

INR 1.38, PTT 25, AST 45, ALT 28, alkaline phosphatase 95.  Urine shows protein 2+,

ketones 2+, blood 2+, leukocyte esterase trace.  Troponin less than 0.02.  Total

amylase 63, lipase 36.  Influenza A and B negative.  Urine and blood culture pending.

 Chest x-ray:  Weak inspiratory effort.  No acute pathology.  Head CT, preliminary: 

Normal.  Chest x-ray:  No acute abnormality.  EKG shows sinus tachycardia, 132.  When

compared with the ECG of September 2015, ventricular rate has increased.  In the

emergency room, the case was discussed with Infectious Disease, recommended to start

imipenem because of the fever, tachycardia, and elevated lactate level.  Case

discussed with Urology, planning to do stent placement after he saw the patient, so

patient kept n.p.o.  Patient admitted in the floor with admitting diagnosis of kidney

stone with urinary infection and rule out sepsis.  Patient was given imipenem,

Tylenol, IV fluid, and pain medication in the emergency room.  Patient admitted in

the floor.

 

ADMITTING DIAGNOSIS:  Urinary tract infection and kidney stone.

 

PLAN:  Continue antibiotics.  ID followup, urology followup.  Continue home

medications.  Continue IV fluid.  Monitor CBC.  Monitor lactate level.  Patient

stable on the floor.

 

 

TEOFILO [id number]

DD: 01/13/2019 09:14

DT: 01/13/2019 13:54

Job #:  [id number]

## 2019-01-13 NOTE — CON.GU
Consult


Consult Specialty:: urology


Reason for Consultation:: uti/right obstructing ureteral stone with acute renal 

insufficiency





- History of Present Illness


Chief Complaint: right colic and fever to 104





- History Source


History Provided By: Patient


Limitations to Obtaining History: No Limitations





- Alcohol/Substance Use


Hx Alcohol Use: No





- Smoking History


Smoking history: Current every day smoker


Have you smoked in the past 12 months: No


Aproximately how many cigarettes per day: 10





Home Medications





- Allergies


Allergies/Adverse Reactions: 


 Allergies











Allergy/AdvReac Type Severity Reaction Status Date / Time


 


No Known Allergies Allergy   Verified 01/10/19 07:17














- Home Medications


Home Medications: 


Ambulatory Orders





Amlodipine Besylate [Norvasc -] 10 mg PO DAILY 01/10/19 


Diclofenac Potassium 50 mg PO ASDIR 01/10/19 


Fluticasone Furoate [Children's Flonase Sensimist] 5.9 ml NS ASDIR 01/10/19 


Loratadine 10 mg PO ASDIR 01/10/19 


Omeprazole 20 mg PO ASDIR 01/10/19 


Oxybutynin Chloride [Ditropan -] 5 mg PO DAILY 01/10/19 


Selenium 25 mcg PO ASDIR 01/10/19 


Tamsulosin HCl [Flomax] 0.4 mg PO DAILY #7 cap.er.24h 01/10/19 


Tramadol HCl 50 mg PO BID #4 tablet MDD 2 01/10/19 











Physical Exam-


Vital Signs: 


 Vital Signs











Temperature  99.9 F H  01/13/19 15:26


 


Pulse Rate  105 H  01/13/19 15:26


 


Respiratory Rate  20   01/13/19 15:26


 


Blood Pressure  116/78   01/13/19 15:26


 


O2 Sat by Pulse Oximetry (%)  98   01/13/19 09:00











Constitutional: Yes: Well Nourished, No Distress, Calm


Eyes: Yes: WNL, Conjunctiva Clear, EOM Intact


HENT: Yes: WNL, Atraumatic, Normocephalic


Neck: Yes: WNL, Supple, Trachea Midline


Cardiovascular: Yes: WNL, Regular Rate and Rhythm


Respiratory: Yes: WNL, Regular


Gastrointestinal: Yes: WNL, Soft


Renal/: Yes: CVA Tenderness - Right


Kidneys: Yes: FLank Pain Right


Pelvis: Yes: Bladder Non Palpable


Testicles: Yes: WNL


Scrotum: Yes: WNL


Penis: Yes: WNL


Prostate Exam: Yes: Asymmetrical, Swollen


Musculoskeletal: Yes: Back Pain


Extremities: Yes: WNL


Labs: 


 CBC, BMP





 01/13/19 09:30 





 01/13/19 09:30 











Imaging





- Results


Cat Scan: Report Reviewed





Assessment/Plan





imp


uti with obstructing right ureteral stone


acute renal insufficiency





plan


continue antibiotics


cystoscopy and stent placement in AM





25 minutes spent with patient

## 2019-01-13 NOTE — EKG
Test Reason : 

Blood Pressure : ***/*** mmHG

Vent. Rate : 132 BPM     Atrial Rate : 132 BPM

   P-R Int : 168 ms          QRS Dur : 092 ms

    QT Int : 282 ms       P-R-T Axes : 065 024 061 degrees

   QTc Int : 417 ms

 

SINUS TACHYCARDIA

OTHERWISE NORMAL ECG

WHEN COMPARED WITH ECG OF 13-SEP-2015 17:38,

VENT. RATE HAS INCREASED BY  83 BPM

Confirmed by MATTHEW GROSSMAN MD ([id number]) on 1/13/2019 8:40:51 AM

 

Referred By:             Confirmed By:MATTHEW GROSSMAN MD

## 2019-01-14 LAB
ALBUMIN SERPL-MCNC: 2.8 G/DL (ref 3.4–5)
ALP SERPL-CCNC: 104 U/L (ref 45–117)
ALT SERPL-CCNC: 45 U/L (ref 13–61)
ANION GAP SERPL CALC-SCNC: 11 MMOL/L (ref 8–16)
AST SERPL-CCNC: 60 U/L (ref 15–37)
BASOPHILS # BLD: 0.2 % (ref 0–2)
BILIRUB SERPL-MCNC: 1 MG/DL (ref 0.2–1)
BUN SERPL-MCNC: 13 MG/DL (ref 7–18)
CALCIUM SERPL-MCNC: 8.2 MG/DL (ref 8.5–10.1)
CHLORIDE SERPL-SCNC: 109 MMOL/L (ref 98–107)
CO2 SERPL-SCNC: 20 MMOL/L (ref 21–32)
CREAT SERPL-MCNC: 1.1 MG/DL (ref 0.55–1.3)
DEPRECATED RDW RBC AUTO: 16.8 % (ref 11.9–15.9)
EOSINOPHIL # BLD: 0.5 % (ref 0–4.5)
GLUCOSE SERPL-MCNC: 135 MG/DL (ref 74–106)
HCT VFR BLD CALC: 40.1 % (ref 35.4–49)
HGB BLD-MCNC: 12.8 GM/DL (ref 11.7–16.9)
LYMPHOCYTES # BLD: 10.2 % (ref 8–40)
MCH RBC QN AUTO: 27.1 PG (ref 25.7–33.7)
MCHC RBC AUTO-ENTMCNC: 31.9 G/DL (ref 32–35.9)
MCV RBC: 84.9 FL (ref 80–96)
MONOCYTES # BLD AUTO: 10.7 % (ref 3.8–10.2)
NEUTROPHILS # BLD: 78.4 % (ref 42.8–82.8)
PLATELET # BLD AUTO: 127 K/MM3 (ref 134–434)
PMV BLD: 8.6 FL (ref 7.5–11.1)
POTASSIUM SERPLBLD-SCNC: 3.3 MMOL/L (ref 3.5–5.1)
PROT SERPL-MCNC: 6 G/DL (ref 6.4–8.2)
RBC # BLD AUTO: 4.72 M/MM3 (ref 4–5.6)
SODIUM SERPL-SCNC: 141 MMOL/L (ref 136–145)
WBC # BLD AUTO: 10.5 K/MM3 (ref 4–10)

## 2019-01-14 PROCEDURE — 0T768DZ DILATION OF RIGHT URETER WITH INTRALUMINAL DEVICE, VIA NATURAL OR ARTIFICIAL OPENING ENDOSCOPIC: ICD-10-PCS | Performed by: UROLOGY

## 2019-01-14 PROCEDURE — BT1DZZZ FLUOROSCOPY OF RIGHT KIDNEY, URETER AND BLADDER: ICD-10-PCS | Performed by: UROLOGY

## 2019-01-14 RX ADMIN — DEXTROSE AND SODIUM CHLORIDE SCH: 5; 450 INJECTION, SOLUTION INTRAVENOUS at 01:42

## 2019-01-14 RX ADMIN — DEXTROSE AND SODIUM CHLORIDE SCH MLS/HR: 5; 450 INJECTION, SOLUTION INTRAVENOUS at 09:13

## 2019-01-14 RX ADMIN — ACETAMINOPHEN PRN MG: 325 TABLET ORAL at 20:55

## 2019-01-14 RX ADMIN — MORPHINE SULFATE PRN MG: 2 INJECTION, SOLUTION INTRAMUSCULAR; INTRAVENOUS at 06:25

## 2019-01-14 RX ADMIN — IMIPENEM AND CILASTATIN SODIUM SCH MLS/HR: 500; 500 INJECTION, POWDER, FOR SOLUTION INTRAVENOUS at 01:41

## 2019-01-14 RX ADMIN — IMIPENEM AND CILASTATIN SODIUM SCH MLS/HR: 500; 500 INJECTION, POWDER, FOR SOLUTION INTRAVENOUS at 09:13

## 2019-01-14 RX ADMIN — PIPERACILLIN SODIUM,TAZOBACTAM SODIUM SCH MLS/HR: 3; .375 INJECTION, POWDER, FOR SOLUTION INTRAVENOUS at 17:58

## 2019-01-14 NOTE — PN
Progress Note, Physician


Chief Complaint: 





pt resting comfortably


afebrile,


pt is on IV antibiotics


urology note appreciated





- Current Medication List


Current Medications: 


Active Medications





Acetaminophen (Tylenol -)  650 mg PO Q6H PRN


   PRN Reason: FEVER


   Last Admin: 01/13/19 16:38 Dose:  650 mg


Imipenem/Cilastatin Sodium 500 (mg/ Sodium Chloride)  100 mls @ 100 mls/hr IVPB 

Q8H-IV NAVYA; Protocol


   Last Admin: 01/14/19 09:13 Dose:  100 mls/hr


Dextrose/Sodium Chloride (D5-1/2ns -)  1,000 mls @ 50 mls/hr IV ASDIR NAVYA


   Last Admin: 01/14/19 09:13 Dose:  50 mls/hr


Vancomycin HCl 1,250 mg/ (Dextrose)  250 mls @ 166.667 mls/hr IVPB ONCE ONE


   Stop: 01/14/19 10:44


Morphine Sulfate (Morphine Sulfate)  2 mg IVPUSH Q6H PRN


   PRN Reason: PAIN LEVEL 5-8


   Last Admin: 01/14/19 06:25 Dose:  2 mg











- Objective


Vital Signs: 


 Vital Signs











Temperature  99 F   01/14/19 07:40


 


Pulse Rate  77   01/14/19 07:40


 


Respiratory Rate  20   01/14/19 07:40


 


Blood Pressure  141/85   01/14/19 07:40


 


O2 Sat by Pulse Oximetry (%)  98   01/13/19 21:00











Constitutional: Yes: No Distress


Eyes: Yes: Conjunctiva Clear


HENT: Yes: Atraumatic


Neck: Yes: Supple, Trachea Midline


Cardiovascular: Yes: Regular Rate and Rhythm


Respiratory: Yes: Regular, CTA Bilaterally


Gastrointestinal: Yes: Normal Bowel Sounds, Soft


Musculoskeletal: Yes: WNL


Extremities: Yes: WNL


Edema: No


Peripheral Pulses WNL: Yes


Neurological: Yes: WNL, Alert


...Motor Strength: WNL


Psychiatric: Yes: WNL


Labs: 


 CBC, BMP





 01/14/19 06:30 





 01/14/19 06:30 





 INR, PTT











INR  1.38  (0.83-1.09)  H  01/12/19  16:30    














Assessment/Plan


CYSTOSCOPy,ureteric stent placement


 Rt ureteric stone ,fever,UTI


Leucocytosis


Pancreatic mass


HTN


PLAN


 continue antibiotics,IV fluid and pain MEDS


UROLOGY f/u


ID f/u


Will monitor CBC and BUN and CREatinine

## 2019-01-14 NOTE — OP
Operative Note





- Note:


Operative Date: 01/14/19


Pre-Operative Diagnosis: right ureteral stone with uti and acute renal 

insufficiency


Operation: cystoscopy/right retrograde pyelogram/right ureteroscopic stone 

manipulation/right uretheral stent placement


Findings: 





obstucting stone with pyuria once stone manipulated free; urine from right 

kidney sent for culture


Post-Operative Diagnosis: Other (pyonephrosis with obstucting ureteral stone)


Surgeon: José Luis Lentz


Anesthesia: General


Specimens Removed: urine culture from right kidney


Drains & Tubes with Location: 6/24 right ureteral stent


Operative Report Dictated: Yes

## 2019-01-14 NOTE — PN
Progress Note, Physician


History of Present Illness: 





patient post procedure now


feels much better


stent placed 





- Current Medication List


Current Medications: 


Active Medications





Acetaminophen (Tylenol -)  650 mg PO Q6H PRN


   PRN Reason: FEVER


Piperacillin Sod/Tazobactam (Sod 3.375 gm/ Dextrose)  50 mls @ 100 mls/hr IVPB 

Q8H-IV NAVYA; Protocol











- Objective


Vital Signs: 


 Vital Signs











Temperature  97.4 F L  01/14/19 11:55


 


Pulse Rate  77   01/14/19 11:55


 


Respiratory Rate  18   01/14/19 11:55


 


Blood Pressure  134/85   01/14/19 11:55


 


O2 Sat by Pulse Oximetry (%)  98   01/14/19 11:25











Constitutional: Yes: No Distress, Calm


Cardiovascular: Yes: Regular Rate and Rhythm


Respiratory: Yes: Regular, CTA Bilaterally


Gastrointestinal: Yes: Normal Bowel Sounds, Soft


Musculoskeletal: Yes: WNL


Extremities: Yes: WNL


Neurological: Yes: Alert, Oriented


Psychiatric: Yes: Alert, Oriented


Labs: 


 CBC, BMP





 01/14/19 06:30 





 01/14/19 06:30 





 INR, PTT











INR  1.38  (0.83-1.09)  H  01/12/19  16:30    














Assessment/Plan





uti


renal stone


gm positive bactermia


pancreatic mass


rt pyelo








plan


patients blood cx positive wiht multiple organisms


awaiting for identification of the organissm


will  add vanco


rest as per the team

## 2019-01-15 LAB
ALBUMIN SERPL-MCNC: 3 G/DL (ref 3.4–5)
ALP SERPL-CCNC: 104 U/L (ref 45–117)
ALT SERPL-CCNC: 48 U/L (ref 13–61)
ANION GAP SERPL CALC-SCNC: 11 MMOL/L (ref 8–16)
AST SERPL-CCNC: 36 U/L (ref 15–37)
BASOPHILS # BLD: 0.1 % (ref 0–2)
BILIRUB SERPL-MCNC: 0.7 MG/DL (ref 0.2–1)
BUN SERPL-MCNC: 17 MG/DL (ref 7–18)
CALCIUM SERPL-MCNC: 8.4 MG/DL (ref 8.5–10.1)
CHLORIDE SERPL-SCNC: 109 MMOL/L (ref 98–107)
CO2 SERPL-SCNC: 21 MMOL/L (ref 21–32)
CREAT SERPL-MCNC: 1.2 MG/DL (ref 0.55–1.3)
DEPRECATED RDW RBC AUTO: 17 % (ref 11.9–15.9)
EOSINOPHIL # BLD: 0 % (ref 0–4.5)
GLUCOSE SERPL-MCNC: 122 MG/DL (ref 74–106)
HCT VFR BLD CALC: 40 % (ref 35.4–49)
HGB BLD-MCNC: 13.8 GM/DL (ref 11.7–16.9)
LYMPHOCYTES # BLD: 11.5 % (ref 8–40)
MCH RBC QN AUTO: 29 PG (ref 25.7–33.7)
MCHC RBC AUTO-ENTMCNC: 34.6 G/DL (ref 32–35.9)
MCV RBC: 83.9 FL (ref 80–96)
MONOCYTES # BLD AUTO: 8.4 % (ref 3.8–10.2)
NEUTROPHILS # BLD: 80 % (ref 42.8–82.8)
PLATELET # BLD AUTO: 179 K/MM3 (ref 134–434)
PMV BLD: 9.1 FL (ref 7.5–11.1)
POTASSIUM SERPLBLD-SCNC: 3.6 MMOL/L (ref 3.5–5.1)
PROT SERPL-MCNC: 6.5 G/DL (ref 6.4–8.2)
RBC # BLD AUTO: 4.77 M/MM3 (ref 4–5.6)
SODIUM SERPL-SCNC: 141 MMOL/L (ref 136–145)
WBC # BLD AUTO: 14.8 K/MM3 (ref 4–10)

## 2019-01-15 RX ADMIN — PIPERACILLIN SODIUM,TAZOBACTAM SODIUM SCH MLS/HR: 3; .375 INJECTION, POWDER, FOR SOLUTION INTRAVENOUS at 09:57

## 2019-01-15 RX ADMIN — PIPERACILLIN SODIUM,TAZOBACTAM SODIUM SCH MLS/HR: 3; .375 INJECTION, POWDER, FOR SOLUTION INTRAVENOUS at 18:02

## 2019-01-15 RX ADMIN — VANCOMYCIN HYDROCHLORIDE SCH MLS/HR: 1 INJECTION, POWDER, LYOPHILIZED, FOR SOLUTION INTRAVENOUS at 14:37

## 2019-01-15 RX ADMIN — PIPERACILLIN SODIUM,TAZOBACTAM SODIUM SCH MLS/HR: 3; .375 INJECTION, POWDER, FOR SOLUTION INTRAVENOUS at 02:42

## 2019-01-15 RX ADMIN — ACETAMINOPHEN PRN MG: 325 TABLET ORAL at 22:48

## 2019-01-15 NOTE — OP
DATE OF OPERATION:  01/14/2019

 

PREOPERATIVE DIAGNOSIS:  Right renal stone with urinary tract infection and acute

renal insufficiency.  

 

POSTOPERATIVE DIAGNOSIS:  Right renal stone with urinary tract infection and acute

renal insufficiency.  

 

PROCEDURE:  Cystoscopy, right retrograde pyelogram, right ureteroscopic stone

manipulation, and right ureteral stent placement.  

 

ATTENDING:  Abhishek Bahena MD

 

ANESTHESIA:  General.

 

OPERATION AS FOLLOWS:  The patient was brought into the operating room, placed in

supine position on the operating room table.  General anesthesia and an extra dose of

gentamicin were administered to the patient.  The vancomycin was also given in the

operating room setting.  The patient was then placed in dorsal lithotomy position,

prepped and draped in the usual sterile manner.  The patient has a history of a

significant UTI with fever to 104 and white count to 517 with renal insufficiency. 

The patient was stabilized and brought to the operating room for an obstructive 5 x 7

mm right ureteral stone.  A retrograde pyelogram showed a high grade

hydroureteronephrosis.  A wire was passed into the kidney at this point. 

Ureteroscopy was performed.  The obstructive stone was noted.  Once it was dislodged

with the ureteroscope, lucy pyuria was noted which severely limited the

visualization of the ureter and stone.  The stone was pushed into the kidney at this

point, and the ureteroscope removed.  The wire was utilized to place a ureteral stent

measuring 6-Romansh x 24 cm.  This was done utilizing the Seldinger technique.  There

were no complications noted.  The patient tolerated the procedure very well.  

 

DISPOSITION:  The patient went to the recovery room.  

 

 

ABHISHEK BAHENA M.D. [id number]

DD: 01/14/2019 14:12

DT: 01/15/2019 07:07

Job #:  [id number]

## 2019-01-15 NOTE — CONSULT
Consult - text type





- Consultation


Consultation Note: 





CC: right pyonephrosis with urosepsis s/p stent placement





hpi:  Patient is improved with resolution of right flank pain.  Patient is 

voiding well with improving hematuria.  Patient is afebrile and denies nause 

and vomiting.





PE


vss; afeb





abd-soft, nontender; minimal right CVAT





labs reviewed


WBC trending downward


creatinine has normalized





imp


urosepsis


right pyonephrosis s/p stent placement





plan


urine from pyonephrotic right kidney sent for culture and positive blood 

cultures will need to be followed for antibiotic coverage as per ID


will follow-up as outpatient

## 2019-01-15 NOTE — PN
Progress Note, Physician


Chief Complaint: 





pt lyin bed,pt feels better


S/p rt ureteral stent placement,cystoscopy,rt retrograde pyelogram


afebrile


Vitals stable


Blood cul pending organism


Pt is on IV fluid,antibiotics and pain meds











- Current Medication List


Current Medications: 


Active Medications





Acetaminophen (Tylenol -)  650 mg PO Q6H PRN


   PRN Reason: FEVER


   Last Admin: 01/14/19 20:55 Dose:  650 mg


Piperacillin Sod/Tazobactam (Sod 3.375 gm/ Dextrose)  50 mls @ 100 mls/hr IVPB 

Q8H-IV NAVYA; Protocol


   Last Admin: 01/15/19 02:42 Dose:  100 mls/hr











- Objective


Vital Signs: 


 Vital Signs











Temperature  98.1 F   01/15/19 06:00


 


Pulse Rate  98 H  01/15/19 06:00


 


Respiratory Rate  20   01/15/19 06:00


 


Blood Pressure  136/96   01/15/19 06:00


 


O2 Sat by Pulse Oximetry (%)  95   01/14/19 21:00











Constitutional: Yes: No Distress


Eyes: Yes: Conjunctiva Clear


HENT: Yes: Atraumatic


Neck: Yes: Supple


Cardiovascular: Yes: Regular Rate and Rhythm


Respiratory: Yes: Regular, CTA Bilaterally


Gastrointestinal: Yes: Normal Bowel Sounds


Labs: 


 INR, PTT











INR  1.38  (0.83-1.09)  H  01/12/19  16:30    














Assessment/Plan


S/p cystoscopy,rt retrograde pyelogram,rt ureteral stent placement


 Rt ureteric stone ,fever,UTI


Leucocytosis


Pancreatic mass


HTN


PLAN


 continue antibiotics,IV fluid and pain MEDS


UROLOGY f/u


ID f/u

## 2019-01-15 NOTE — PN
Progress Note, Physician


History of Present Illness: 





patient stable


no fevers


blood cx results noted


awaiting identification of organisms





- Current Medication List


Current Medications: 


Active Medications





Acetaminophen (Tylenol -)  650 mg PO Q6H PRN


   PRN Reason: FEVER


   Last Admin: 01/14/19 20:55 Dose:  650 mg


Piperacillin Sod/Tazobactam (Sod 3.375 gm/ Dextrose)  50 mls @ 100 mls/hr IVPB 

Q8H-IV NAVYA; Protocol


   Last Admin: 01/15/19 09:57 Dose:  100 mls/hr











- Objective


Vital Signs: 


 Vital Signs











Temperature  97.3 F L  01/15/19 10:00


 


Pulse Rate  67   01/15/19 10:00


 


Respiratory Rate  18   01/15/19 10:00


 


Blood Pressure  146/82   01/15/19 10:00


 


O2 Sat by Pulse Oximetry (%)  99   01/15/19 09:00











Constitutional: Yes: No Distress, Calm


Cardiovascular: Yes: Regular Rate and Rhythm


Respiratory: Yes: Regular, CTA Bilaterally


Gastrointestinal: Yes: Normal Bowel Sounds, Soft


Musculoskeletal: Yes: WNL


Extremities: Yes: WNL


Neurological: Yes: Alert, Oriented


Psychiatric: Yes: Alert, Oriented


Labs: 


 CBC, BMP





 01/15/19 06:00 





 01/15/19 06:00 





 INR, PTT











INR  1.38  (0.83-1.09)  H  01/12/19  16:30    














Assessment/Plan





uti


renal stone


gm positive bactermia


pancreatic mass








plan


continue zosyn


will continue vanco


will repeat blood cx


plan of pancreatic mass 


rest as per the team

## 2019-01-16 RX ADMIN — ACETAMINOPHEN PRN MG: 325 TABLET ORAL at 14:55

## 2019-01-16 RX ADMIN — PIPERACILLIN SODIUM,TAZOBACTAM SODIUM SCH MLS/HR: 3; .375 INJECTION, POWDER, FOR SOLUTION INTRAVENOUS at 01:30

## 2019-01-16 RX ADMIN — PIPERACILLIN SODIUM,TAZOBACTAM SODIUM SCH MLS/HR: 3; .375 INJECTION, POWDER, FOR SOLUTION INTRAVENOUS at 20:13

## 2019-01-16 RX ADMIN — VANCOMYCIN HYDROCHLORIDE SCH MLS/HR: 1 INJECTION, POWDER, LYOPHILIZED, FOR SOLUTION INTRAVENOUS at 15:07

## 2019-01-16 RX ADMIN — PIPERACILLIN SODIUM,TAZOBACTAM SODIUM SCH MLS/HR: 3; .375 INJECTION, POWDER, FOR SOLUTION INTRAVENOUS at 10:30

## 2019-01-16 RX ADMIN — VANCOMYCIN HYDROCHLORIDE SCH MLS/HR: 1 INJECTION, POWDER, LYOPHILIZED, FOR SOLUTION INTRAVENOUS at 02:17

## 2019-01-16 NOTE — PN
Progress Note, Physician


History of Present Illness: 





complaining of dysuria


still awaiting identification of the organisms





- Current Medication List


Current Medications: 


Active Medications





Acetaminophen (Tylenol -)  650 mg PO Q6H PRN


   PRN Reason: FEVER


   Last Admin: 01/15/19 22:48 Dose:  650 mg


Piperacillin Sod/Tazobactam (Sod 3.375 gm/ Dextrose)  50 mls @ 100 mls/hr IVPB 

Q8H-IV NAVYA; Protocol


   Last Admin: 01/16/19 10:30 Dose:  100 mls/hr


Vancomycin HCl 1,250 mg/ (Dextrose)  250 mls @ 166.667 mls/hr IVPB Q12H NAVYA; 

Protocol


   Last Admin: 01/16/19 02:17 Dose:  166.667 mls/hr


Polyethylene Glycol (Miralax (For Daily Use) -)  17 gm PO DAILY PRN


   PRN Reason: CONSTIPATION











- Objective


Vital Signs: 


 Vital Signs











Temperature  98.0 F   01/16/19 06:00


 


Pulse Rate  57 L  01/16/19 06:00


 


Respiratory Rate  18   01/16/19 06:00


 


Blood Pressure  139/86   01/16/19 06:00


 


O2 Sat by Pulse Oximetry (%)  99   01/15/19 21:00











Constitutional: Yes: Calm, Mild Distress


Cardiovascular: Yes: Regular Rate and Rhythm


Respiratory: Yes: Regular, CTA Bilaterally


Gastrointestinal: Yes: Normal Bowel Sounds, Soft


Musculoskeletal: Yes: WNL


Extremities: Yes: WNL


Neurological: Yes: Alert, Oriented


Psychiatric: Yes: Alert, Oriented


Labs: 


 CBC, BMP





 01/15/19 06:00 





 01/15/19 06:00 





 INR, PTT











INR  1.38  (0.83-1.09)  H  01/12/19  16:30    














Assessment/Plan





uti


renal stone


gm positive bactermia


pancreatic mass


rt pyelo








plan


await for repeat blood cx


will continue current abx


await for identification of the organisms


rest as per the team


will check vanco trough

## 2019-01-16 NOTE — CON.GI
Consult


Consult Specialty:: Gastroenterology


Referred by:: Dr. Michelle Chavez


Reason for Consultation:: Pancreatic Mass





- History of Present Illness


Chief Complaint: RUQ pain and nausea


History of Present Illness: 





Patient is a 69 y/o male admitted for renal colic.  Patient was asked to be 

seen due to incidental finding of pancreatic mass on CT scan performed on 1/10/

19.  Mass measures 2.5cm in size.  Patient had biopsy of mass done 4 months ago 

at [facility] and was told mass is benign.  Denies nausea, vomiting, diarrhea, 

dysphagia, abnormal weight loss. Lost 30 lbs in 3 mos after having an emergency 

surgery for ruptured appendix and gallbladder Nov 2018 . He was hospitalized in 

[facility].





- History Source


History Provided By: Patient


Limitations to Obtaining History: No Limitations





- Past Medical History


CNS: Yes: CVA


Cardio/Vascular: Yes: HTN


Renal/: Yes: Renal Calculi





- Past Surgical History


Past Surgical History: Yes: Appendectomy, Cholecystectomy





- Alcohol/Substance Use


Hx Alcohol Use: No





- Smoking History


Smoking history: Current every day smoker


Have you smoked in the past 12 months: No


Aproximately how many cigarettes per day: 10





Home Medications





- Allergies


Allergies/Adverse Reactions: 


 Allergies











Allergy/AdvReac Type Severity Reaction Status Date / Time


 


No Known Allergies Allergy   Verified 01/10/19 07:17














- Home Medications


Home Medications: 


Ambulatory Orders





Amlodipine Besylate [Norvasc -] 10 mg PO DAILY 01/10/19 


Diclofenac Potassium 50 mg PO ASDIR 01/10/19 


Fluticasone Furoate [Children's Flonase Sensimist] 5.9 ml NS ASDIR 01/10/19 


Loratadine 10 mg PO ASDIR 01/10/19 


Omeprazole 20 mg PO ASDIR 01/10/19 


Oxybutynin Chloride [Ditropan -] 5 mg PO DAILY 01/10/19 


Selenium 25 mcg PO ASDIR 01/10/19 


Tamsulosin HCl [Flomax] 0.4 mg PO DAILY #7 cap.er.24h 01/10/19 


Tramadol HCl 50 mg PO BID #4 tablet MDD 2 01/10/19 











Review of Systems





- Review of Systems


Constitutional: reports: No Symptoms


Eyes: reports: No Symptoms


HENT: reports: No Symptoms


Neck: reports: No Symptoms


Cardiovascular: reports: No Symptoms


Respiratory: reports: No Symptoms


Gastrointestinal: reports: Constipation


Genitourinary: reports: No Symptoms


Breasts: reports: No Symptoms Reported


Musculoskeletal: reports: No Symptoms


Integumentary: reports: No Symptoms


Neurological: reports: No Symptoms


Endocrine: reports: No Symptoms


Hematology/Lymphatic: reports: No Symptoms


Psychiatric: reports: No Symptoms





Physical Exam-GI


Vital Signs: 


 Vital Signs











Temperature  98 F   01/16/19 10:54


 


Pulse Rate  57 L  01/16/19 10:54


 


Respiratory Rate  20   01/16/19 10:54


 


Blood Pressure  135/73   01/16/19 10:54


 


O2 Sat by Pulse Oximetry (%)  99   01/15/19 21:00











Constitutional: Yes: Well Nourished, No Distress, Calm


Eyes: Yes: Conjunctiva Clear


Neck: Yes: Supple


Cardiovascular: Yes: Regular Rate and Rhythm


Respiratory: Yes: Regular, CTA Bilaterally


Gastrointestinal Inspection: Yes: WNL


...Auscultate: Yes: Normoactive Bowel Sounds.  No: No Bowel Sounds


...Palpate: Yes: Soft.  No: Firm/Rigid, Guarding, Mass, Pulsatile Mass, 

Splenomegaly, Tenderness, Tenderness, Epigastium


Labs: 


 CBC, BMP





 01/15/19 06:00 





 01/15/19 06:00 





 INR, PTT











INR  1.38  (0.83-1.09)  H  01/12/19  16:30    








 Hepatic Panel











Total Bilirubin  0.7 mg/dL (0.2-1)   01/15/19  06:00    


 


AST  36 U/L (15-37)   01/15/19  06:00    


 


ALT  48 U/L (13-61)   01/15/19  06:00    


 


Alkaline Phosphatase  104 U/L ()   01/15/19  06:00    


 


Albumin  3.0 g/dl (3.4-5.0)  L  01/15/19  06:00    








 Home Medications











 Medication  Instructions  Recorded


 


Amlodipine Besylate [Norvasc -] 10 mg PO DAILY 01/10/19


 


Diclofenac Potassium 50 mg PO ASDIR 01/10/19


 


Fluticasone Furoate [Children's 5.9 ml NS ASDIR 01/10/19





Flonase Sensimist]  


 


Loratadine 10 mg PO ASDIR 01/10/19


 


Omeprazole 20 mg PO ASDIR 01/10/19


 


Oxybutynin Chloride [Ditropan -] 5 mg PO DAILY 01/10/19


 


Selenium 25 mcg PO ASDIR 01/10/19


 


Tamsulosin HCl [Flomax] 0.4 mg PO DAILY #7 cap.er.24h 01/10/19


 


Tramadol HCl 50 mg PO BID #4 tablet MDD 2 01/10/19








 Current Medications





Acetaminophen (Tylenol -)  650 mg PO Q6H PRN


   PRN Reason: FEVER


   Last Admin: 01/16/19 14:55 Dose:  650 mg


Piperacillin Sod/Tazobactam (Sod 3.375 gm/ Dextrose)  50 mls @ 100 mls/hr IVPB 

Q8H-IV NAVYA; Protocol


   Last Admin: 01/16/19 20:13 Dose:  100 mls/hr


Vancomycin HCl 1,250 mg/ (Dextrose)  250 mls @ 166.667 mls/hr IVPB Q12H NAVYA; 

Protocol


   Last Admin: 01/16/19 15:07 Dose:  166.667 mls/hr


Sodium Chloride (Normal Saline -)  1,000 mls @ 100 mls/hr IV ASDIR NAVYA


   Stop: 01/16/19 22:14


   Last Admin: 01/16/19 15:06 Dose:  100 mls/hr


Polyethylene Glycol (Miralax (For Daily Use) -)  17 gm PO DAILY PRN


   PRN Reason: CONSTIPATION











Problem List





- Problems


(1) Pancreatic mass


Assessment/Plan: 


Pancreatic Mass R/O Pancreatic Neoplasm


-CA 19-9


-CEA


-will schedule MRI of pancreas with contrast


-NS at 100cc/hr for hydration prior to MRI


Code(s): K86.9 - DISEASE OF PANCREAS, UNSPECIFIED

## 2019-01-16 NOTE — PN
Progress Note, Physician


Chief Complaint: 





pt lyin bed,pt feels better


urosepsis


S/p rt ureteral stent placement,cystoscopy,rt retrograde pyelogram


afebrile


Vitals stable


Blood cul pending organism


Pt is on IV fluid,antibiotics and pain meds











- Current Medication List


Current Medications: 


Active Medications





Acetaminophen (Tylenol -)  650 mg PO Q6H PRN


   PRN Reason: FEVER


   Last Admin: 01/15/19 22:48 Dose:  650 mg


Piperacillin Sod/Tazobactam (Sod 3.375 gm/ Dextrose)  50 mls @ 100 mls/hr IVPB 

Q8H-IV NAVYA; Protocol


   Last Admin: 01/16/19 01:30 Dose:  100 mls/hr


Vancomycin HCl 1,250 mg/ (Dextrose)  250 mls @ 166.667 mls/hr IVPB Q12H NAVYA; 

Protocol


   Last Admin: 01/16/19 02:17 Dose:  166.667 mls/hr











- Objective


Vital Signs: 


 Vital Signs











Temperature  98.0 F   01/16/19 06:00


 


Pulse Rate  57 L  01/16/19 06:00


 


Respiratory Rate  18   01/16/19 06:00


 


Blood Pressure  139/86   01/16/19 06:00


 


O2 Sat by Pulse Oximetry (%)  99   01/15/19 21:00











Constitutional: Yes: No Distress


Eyes: Yes: Conjunctiva Clear


HENT: Yes: Atraumatic


Neck: Yes: Supple


Cardiovascular: Yes: Regular Rate and Rhythm


Respiratory: Yes: Regular


Gastrointestinal: Yes: Normal Bowel Sounds


Musculoskeletal: Yes: WNL


Edema: Yes


Peripheral Pulses WNL: Yes


Neurological: Yes: WNL, Alert


...Motor Strength: WNL


Psychiatric: Yes: WNL


Labs: 


 CBC, BMP





 01/15/19 06:00 





 01/15/19 06:00 





 INR, PTT











INR  1.38  (0.83-1.09)  H  01/12/19  16:30    














Assessment/Plan


S/p cystoscopy,rt retrograde pyelogram,rt ureteral stent placement


urosepsis


 Rt ureteric stone ,fever,UTI


Leucocytosis


Pancreatic mass


HTN


PLAN


 continue antibiotics,IV fluid and pain MEDS


UROLOGY f/u


ID f/u


will f/u blood cul

## 2019-01-16 NOTE — PN
Progress Note (short form)





- Note


Progress Note: 


S/P Cystoscopy with stent placement under GA uneventful.Patient stable.No any 

anesthesia related problem.Patient DC from the anesthesia care.

## 2019-01-17 LAB
ALBUMIN SERPL-MCNC: 2.7 G/DL (ref 3.4–5)
ALP SERPL-CCNC: 88 U/L (ref 45–117)
ALT SERPL-CCNC: 31 U/L (ref 13–61)
ANION GAP SERPL CALC-SCNC: 10 MMOL/L (ref 8–16)
ANISOCYTOSIS BLD QL: 0
AST SERPL-CCNC: 17 U/L (ref 15–37)
BASOPHILS # BLD: 0.6 % (ref 0–2)
BILIRUB SERPL-MCNC: 0.8 MG/DL (ref 0.2–1)
BUN SERPL-MCNC: 11 MG/DL (ref 7–18)
CALCIUM SERPL-MCNC: 7.9 MG/DL (ref 8.5–10.1)
CHLORIDE SERPL-SCNC: 108 MMOL/L (ref 98–107)
CO2 SERPL-SCNC: 23 MMOL/L (ref 21–32)
CREAT SERPL-MCNC: 0.9 MG/DL (ref 0.55–1.3)
DEPRECATED RDW RBC AUTO: 17.3 % (ref 11.9–15.9)
EOSINOPHIL # BLD: 3.3 % (ref 0–4.5)
GLUCOSE SERPL-MCNC: 96 MG/DL (ref 74–106)
HCT VFR BLD CALC: 38.2 % (ref 35.4–49)
HGB BLD-MCNC: 12.9 GM/DL (ref 11.7–16.9)
LYMPHOCYTES # BLD: 23.5 % (ref 8–40)
MACROCYTES BLD QL: 0
MCH RBC QN AUTO: 28.7 PG (ref 25.7–33.7)
MCHC RBC AUTO-ENTMCNC: 33.8 G/DL (ref 32–35.9)
MCV RBC: 84.7 FL (ref 80–96)
MONOCYTES # BLD AUTO: 11.2 % (ref 3.8–10.2)
NEUTROPHILS # BLD: 61.4 % (ref 42.8–82.8)
PLATELET # BLD AUTO: 230 K/MM3 (ref 134–434)
PLATELET BLD QL SMEAR: NORMAL
PMV BLD: 8.3 FL (ref 7.5–11.1)
POTASSIUM SERPLBLD-SCNC: 3.6 MMOL/L (ref 3.5–5.1)
PROT SERPL-MCNC: 6 G/DL (ref 6.4–8.2)
RBC # BLD AUTO: 4.5 M/MM3 (ref 4–5.6)
SODIUM SERPL-SCNC: 141 MMOL/L (ref 136–145)
WBC # BLD AUTO: 11.8 K/MM3 (ref 4–10)

## 2019-01-17 RX ADMIN — PIPERACILLIN SODIUM,TAZOBACTAM SODIUM SCH MLS/HR: 3; .375 INJECTION, POWDER, FOR SOLUTION INTRAVENOUS at 02:15

## 2019-01-17 RX ADMIN — PIPERACILLIN SODIUM,TAZOBACTAM SODIUM SCH: 3; .375 INJECTION, POWDER, FOR SOLUTION INTRAVENOUS at 10:15

## 2019-01-17 RX ADMIN — VANCOMYCIN HYDROCHLORIDE SCH MLS/HR: 1 INJECTION, POWDER, LYOPHILIZED, FOR SOLUTION INTRAVENOUS at 02:15

## 2019-01-17 RX ADMIN — PIPERACILLIN SODIUM,TAZOBACTAM SODIUM SCH MLS/HR: 3; .375 INJECTION, POWDER, FOR SOLUTION INTRAVENOUS at 11:31

## 2019-01-17 RX ADMIN — PIPERACILLIN SODIUM,TAZOBACTAM SODIUM SCH MLS/HR: 3; .375 INJECTION, POWDER, FOR SOLUTION INTRAVENOUS at 18:47

## 2019-01-17 RX ADMIN — VANCOMYCIN HYDROCHLORIDE SCH MLS/HR: 1 INJECTION, POWDER, LYOPHILIZED, FOR SOLUTION INTRAVENOUS at 15:01

## 2019-01-17 NOTE — PN
Progress Note, Physician


History of Present Illness: 





Pt seen and examined, events noted. Wife at bedside. Pt states he feels well, 

has no specific complaints. 





- Current Medication List


Current Medications: 


Active Medications





Acetaminophen (Tylenol -)  650 mg PO Q6H PRN


   PRN Reason: FEVER


   Last Admin: 01/16/19 14:55 Dose:  650 mg


Piperacillin Sod/Tazobactam (Sod 3.375 gm/ Dextrose)  50 mls @ 100 mls/hr IVPB 

Q8H-IV NAVYA; Protocol


   Last Admin: 01/17/19 11:31 Dose:  100 mls/hr


Vancomycin HCl 1,250 mg/ (Dextrose)  250 mls @ 166.667 mls/hr IVPB Q12H NAVYA; 

Protocol


   Last Admin: 01/17/19 02:15 Dose:  166.667 mls/hr


Polyethylene Glycol (Miralax (For Daily Use) -)  17 gm PO DAILY PRN


   PRN Reason: CONSTIPATION











- Objective


Vital Signs: 


 Vital Signs











Temperature  97.8 F   01/17/19 11:00


 


Pulse Rate  85   01/17/19 11:00


 


Respiratory Rate  20   01/17/19 11:00


 


Blood Pressure  147/85   01/17/19 11:00


 


O2 Sat by Pulse Oximetry (%)  99   01/16/19 11:00











Constitutional: Yes: No Distress, Calm


Cardiovascular: Yes: Regular Rate and Rhythm


Respiratory: Yes: CTA Bilaterally


Gastrointestinal: Yes: Normal Bowel Sounds, Soft


Genitourinary: Yes: WNL


Extremities: Yes: WNL


Integumentary: Yes: WNL


Neurological: Yes: Alert, Oriented


Labs: 


 CBC, BMP





 01/17/19 07:00 





 01/17/19 07:00 





 INR, PTT











INR  1.38  (0.83-1.09)  H  01/12/19  16:30    








 Microbiology





01/12/19 16:30   Blood - Peripheral Venous   Blood Culture - Preliminary


                            Gram Positive Cocci


                            Gram Positive Cocci#2


01/12/19 17:00   Blood - Peripheral Venous   Blood Culture - Preliminary


                            Gram Positive Cocci


                            Gram Positive Cocci#2


01/16/19 06:20   Blood - Peripheral Venous   Blood Culture - Preliminary


                            NO GROWTH OBTAINED AFTER 24 HOURS, INCUBATION TO 

CONTINUE


                            FOR 4 DAYS.


01/16/19 06:15   Blood - Peripheral Venous   Blood Culture - Preliminary


                            NO GROWTH OBTAINED AFTER 24 HOURS, INCUBATION TO 

CONTINUE


                            FOR 4 DAYS.


01/14/19 13:03   Urine - Urine Clean Catch   Urine Culture - Final


                            NO GROWTH OBTAINED


01/12/19 17:10   Urine - Urine Clean Catch   Urine Culture - Final











Problem List





- Problems


(1) Fever


Code(s): R50.9 - FEVER, UNSPECIFIED   





(2) Kidney stone


Code(s): N20.0 - CALCULUS OF KIDNEY   





(3) Pancreatic mass


Code(s): K86.9 - DISEASE OF PANCREAS, UNSPECIFIED   





(4) HTN (hypertension)


Code(s): I10 - ESSENTIAL (PRIMARY) HYPERTENSION   





Assessment/Plan





UTI/Obstructing renal stone s/p stent placement


Gram Positive Bacteremia





-- continue IV antibiotics


-- follow up Blood culture results, repeat blood cultures no growth in 24hrs


-- GI following for workup of pancreatic mass


continue monitor

## 2019-01-17 NOTE — PN
Progress Note, Physician


Chief Complaint: 





pt lyin bed,pt feels better


urosepsis


S/p rt ureteral stent placement,cystoscopy,rt retrograde pyelogram


afebrile


Vitals stable


Blood cul gm positive cocci,rpt blood cul negative


Pt is on IV fluid,antibiotics and pain meds


Gi consult pending














- Current Medication List


Current Medications: 


Active Medications





Acetaminophen (Tylenol -)  650 mg PO Q6H PRN


   PRN Reason: FEVER


   Last Admin: 01/16/19 14:55 Dose:  650 mg


Piperacillin Sod/Tazobactam (Sod 3.375 gm/ Dextrose)  50 mls @ 100 mls/hr IVPB 

Q8H-IV NAVYA; Protocol


   Last Admin: 01/17/19 02:15 Dose:  100 mls/hr


Vancomycin HCl 1,250 mg/ (Dextrose)  250 mls @ 166.667 mls/hr IVPB Q12H NAVYA; 

Protocol


   Last Admin: 01/17/19 02:15 Dose:  166.667 mls/hr


Polyethylene Glycol (Miralax (For Daily Use) -)  17 gm PO DAILY PRN


   PRN Reason: CONSTIPATION











- Objective


Vital Signs: 


 Vital Signs











Temperature  98.1 F   01/17/19 06:00


 


Pulse Rate  65   01/17/19 06:00


 


Respiratory Rate  20   01/17/19 06:00


 


Blood Pressure  142/84   01/17/19 06:00


 


O2 Sat by Pulse Oximetry (%)  99   01/16/19 11:00











Constitutional: Yes: No Distress


Eyes: Yes: Conjunctiva Clear


HENT: Yes: Atraumatic


Neck: Yes: Supple, Trachea Midline


Cardiovascular: Yes: Regular Rate and Rhythm


Respiratory: Yes: Regular, CTA Bilaterally


Gastrointestinal: Yes: Normal Bowel Sounds, Soft


Musculoskeletal: Yes: WNL


Extremities: Yes: WNL


Edema: No


Peripheral Pulses WNL: Yes


Neurological: Yes: WNL, Alert


...Motor Strength: WNL


Psychiatric: Yes: WNL, Alert


Labs: 


 CBC, BMP





 01/17/19 07:00 





 01/17/19 07:00 





 INR, PTT











INR  1.38  (0.83-1.09)  H  01/12/19  16:30    














Assessment/Plan


S/p cystoscopy,rt retrograde pyelogram,rt ureteral stent placement


urosepsis


 Rt ureteric stone ,fever,UTI


Leucocytosis


Pancreatic mass


FOR MRI abdomen


HTN


PLAN


 continue antibiotics,IV fluid and pain MEDS


UROLOGY f/u


ID f/u


will f/u MRI report

## 2019-01-18 RX ADMIN — VANCOMYCIN HYDROCHLORIDE SCH MLS/HR: 1 INJECTION, POWDER, LYOPHILIZED, FOR SOLUTION INTRAVENOUS at 02:16

## 2019-01-18 RX ADMIN — VANCOMYCIN HYDROCHLORIDE SCH MLS/HR: 1 INJECTION, POWDER, LYOPHILIZED, FOR SOLUTION INTRAVENOUS at 15:00

## 2019-01-18 RX ADMIN — PIPERACILLIN SODIUM,TAZOBACTAM SODIUM SCH MLS/HR: 3; .375 INJECTION, POWDER, FOR SOLUTION INTRAVENOUS at 01:37

## 2019-01-18 RX ADMIN — PIPERACILLIN SODIUM,TAZOBACTAM SODIUM SCH MLS/HR: 3; .375 INJECTION, POWDER, FOR SOLUTION INTRAVENOUS at 11:11

## 2019-01-18 NOTE — PN
Progress Note, Physician


Chief Complaint: 





pt lyin bed,pt feels better


urosepsis


S/p rt ureteral stent placement,cystoscopy,rt retrograde pyelogram


afebrile


Vitals stable


gm positive bacteremia,rpt blood cul negative


Pt is on IV fluid,antibiotics and pain meds


MRI of abdomen with MRCP shows4.6x2.6 cm mass in Pancreatic body proximal tail 

with distal pacreatic atrophy and ductal dialation hihgly suspicious for 

neoplastic process


some perjpancreatic lymphnode


,simple renal cyst











- Current Medication List


Current Medications: 


Active Medications





Acetaminophen (Tylenol -)  650 mg PO Q6H PRN


   PRN Reason: FEVER


   Last Admin: 01/16/19 14:55 Dose:  650 mg


Piperacillin Sod/Tazobactam (Sod 3.375 gm/ Dextrose)  50 mls @ 100 mls/hr IVPB 

Q8H-IV NAVYA; Protocol


   Last Admin: 01/18/19 01:37 Dose:  100 mls/hr


Vancomycin HCl 1,250 mg/ (Dextrose)  250 mls @ 166.667 mls/hr IVPB Q12H NAVYA; 

Protocol


   Last Admin: 01/18/19 02:16 Dose:  166.667 mls/hr


Polyethylene Glycol (Miralax (For Daily Use) -)  17 gm PO DAILY PRN


   PRN Reason: CONSTIPATION











- Objective


Vital Signs: 


 Vital Signs











Temperature  98.0 F   01/18/19 06:00


 


Pulse Rate  58 L  01/18/19 06:00


 


Respiratory Rate  20   01/18/19 06:00


 


Blood Pressure  140/80   01/18/19 06:00


 


O2 Sat by Pulse Oximetry (%)  99   01/16/19 11:00











Constitutional: Yes: No Distress


Eyes: Yes: Conjunctiva Clear


HENT: Yes: Atraumatic


Neck: Yes: Supple


Cardiovascular: Yes: Regular Rate and Rhythm


Respiratory: Yes: Regular, CTA Bilaterally


Gastrointestinal: Yes: Normal Bowel Sounds, Soft


Musculoskeletal: Yes: WNL


Extremities: Yes: WNL


Edema: No


Peripheral Pulses WNL: Yes


Neurological: Yes: WNL, Alert


...Motor Strength: WNL


Psychiatric: Yes: WNL


Labs: 


 CBC, BMP





 01/17/19 07:00 





 01/17/19 07:00 





 INR, PTT











INR  1.38  (0.83-1.09)  H  01/12/19  16:30    














Assessment/Plan


S/p cystoscopy,rt retrograde pyelogram,rt ureteral stent placement


urosepsis


 Rt ureteric stone ,fever,UTI


Leucocytosis coming down


Pancreatic mass


CEA2,[id number] 23


gm positive bacteremia


HTN


PLAN


 continue antibiotics,IV fluid and pain MEDS


UROLOGY f/u


ID f/u


Gi f/u


Oncology consult

## 2019-01-18 NOTE — PN
Progress Note, Physician


History of Present Illness: 





patient doing well


no new issues


wbc trending down





- Current Medication List


Current Medications: 


Active Medications





Acetaminophen (Tylenol -)  650 mg PO Q6H PRN


   PRN Reason: FEVER


   Last Admin: 01/16/19 14:55 Dose:  650 mg


Piperacillin Sod/Tazobactam (Sod 3.375 gm/ Dextrose)  50 mls @ 100 mls/hr IVPB 

Q8H-IV NAVYA; Protocol


   Last Admin: 01/18/19 11:11 Dose:  100 mls/hr


Vancomycin HCl 1,250 mg/ (Dextrose)  250 mls @ 166.667 mls/hr IVPB Q12H NAVYA; 

Protocol


   Last Admin: 01/18/19 02:16 Dose:  166.667 mls/hr


Polyethylene Glycol (Miralax (For Daily Use) -)  17 gm PO DAILY PRN


   PRN Reason: CONSTIPATION











- Objective


Vital Signs: 


 Vital Signs











Temperature  98.2 F   01/18/19 10:00


 


Pulse Rate  60   01/18/19 10:00


 


Respiratory Rate  17   01/18/19 10:00


 


Blood Pressure  144/75   01/18/19 10:00


 


O2 Sat by Pulse Oximetry (%)  99   01/16/19 11:00











Constitutional: Yes: No Distress, Calm


Cardiovascular: Yes: Regular Rate and Rhythm


Respiratory: Yes: Regular, CTA Bilaterally


Gastrointestinal: Yes: Normal Bowel Sounds, Soft


Musculoskeletal: Yes: WNL


Extremities: Yes: WNL


Neurological: Yes: Alert, Oriented


Psychiatric: Yes: Alert, Oriented


Labs: 


 CBC, BMP





 01/17/19 07:00 





 01/17/19 07:00 





 INR, PTT











INR  1.38  (0.83-1.09)  H  01/12/19  16:30    














Assessment/Plan





uti


renal stone


gm positive bactermia


pancreatic mass


rt pyelo








plan


repeat blood cx negative


will agusto masters


spoke with micro


probably id of the organism tomorrow


rest as per the becka alvarez

## 2019-01-19 RX ADMIN — VANCOMYCIN HYDROCHLORIDE SCH MLS/HR: 1 INJECTION, POWDER, LYOPHILIZED, FOR SOLUTION INTRAVENOUS at 01:45

## 2019-01-19 RX ADMIN — VANCOMYCIN HYDROCHLORIDE SCH MLS/HR: 1 INJECTION, POWDER, LYOPHILIZED, FOR SOLUTION INTRAVENOUS at 15:22

## 2019-01-19 NOTE — PN
Progress Note, Physician


Chief Complaint: 





pt lyin bed,pt feels better


urosepsis


S/p rt ureteral stent placement,cystoscopy,rt retrograde pyelogram


afebrile


Vitals stable


gm positive bacteremia,rpt blood cul negative


Pt is on IV fluid,antibiotics and pain meds


MRI of abdomen with MRCP shows4.6x2.6 cm mass in Pancreatic body proximal tail 

with distal pacreatic atrophy and ductal dialation hihgly suspicious for 

neoplastic process


some perjpancreatic lymphnode


,simple renal cyst


oncology cosult pending











- Current Medication List


Current Medications: 


Active Medications





Acetaminophen (Tylenol -)  650 mg PO Q6H PRN


   PRN Reason: FEVER


   Last Admin: 01/16/19 14:55 Dose:  650 mg


Vancomycin HCl 1,250 mg/ (Dextrose)  250 mls @ 166.667 mls/hr IVPB Q12H NAVYA; 

Protocol


   Last Admin: 01/19/19 01:45 Dose:  166.667 mls/hr


Polyethylene Glycol (Miralax (For Daily Use) -)  17 gm PO DAILY PRN


   PRN Reason: CONSTIPATION











- Objective


Vital Signs: 


 Vital Signs











Temperature  98.0 F   01/19/19 07:14


 


Pulse Rate  75   01/19/19 07:14


 


Respiratory Rate  18   01/19/19 07:14


 


Blood Pressure  160/86   01/19/19 07:14


 


O2 Sat by Pulse Oximetry (%)  97   01/18/19 21:00











Constitutional: Yes: No Distress


Eyes: Yes: Conjunctiva Clear


HENT: Yes: Atraumatic


Neck: Yes: Supple, Trachea Midline


Cardiovascular: Yes: Regular Rate and Rhythm


Respiratory: Yes: Regular, CTA Bilaterally


Gastrointestinal: Yes: Normal Bowel Sounds, Soft


Musculoskeletal: Yes: WNL


Extremities: Yes: WNL


Edema: No


Peripheral Pulses WNL: Yes


Neurological: Yes: WNL, Alert


...Motor Strength: WNL


Psychiatric: Yes: WNL


Labs: 


 CBC, BMP





 01/17/19 07:00 





 01/17/19 07:00 





 INR, PTT











INR  1.38  (0.83-1.09)  H  01/12/19  16:30    














Assessment/Plan


S/p cystoscopy,rt retrograde pyelogram,rt ureteral stent placement


urosepsis


 Rt ureteric stone ,fever,UTI


Leucocytosis coming down


Pancreatic mass


CEA2,[id number] 23


gm positive bacteremia


HTN


PLAN


 continue antibiotics,IV fluid and pain MEDS


UROLOGY f/u


ID f/u


Gi f/u


Oncology consult pending

## 2019-01-19 NOTE — CONSULT
Consult


Consult Specialty:: Oncology


Referred by:: Medicine


Reason for Consultation:: Pancreatic mass





- History of Present Illness


Chief Complaint: Abdominal pain


History of Present Illness: 





Patient admitted 1 week ago with fever and abdominal pain, attributed to 

pyelonephritiswith ureteral stone R, now doing better following cystoscopy, 

removal of stone, stent placement and antibiotics.


CT revealed pancreatic mass, confirmed on MRI.


Patient aware of mass - informs me it was extensively [facility] the [place], including biopsy, and he was informed it was 

benign.








- History Source


History Provided By: Patient, Medical Record





- Past Medical History


CNS: Yes: CVA


Cardio/Vascular: Yes: HTN


Renal/: Yes: Renal Calculi





- Past Surgical History


Past Surgical History: Yes: Appendectomy, Cholecystectomy





- Alcohol/Substance Use


Hx Alcohol Use: No





- Smoking History


Smoking history: Current every day smoker


Have you smoked in the past 12 months: No


Aproximately how many cigarettes per day: 10





Home Medications





- Allergies


Allergies/Adverse Reactions: 


 Allergies











Allergy/AdvReac Type Severity Reaction Status Date / Time


 


No Known Allergies Allergy   Verified 01/10/19 07:17














- Home Medications


Home Medications: 


Ambulatory Orders





Amlodipine Besylate [Norvasc -] 10 mg PO DAILY 01/10/19 


Diclofenac Potassium 50 mg PO ASDIR 01/10/19 


Fluticasone Furoate [Children's Flonase Sensimist] 5.9 ml NS ASDIR 01/10/19 


Loratadine 10 mg PO ASDIR 01/10/19 


Omeprazole 20 mg PO ASDIR 01/10/19 


Oxybutynin Chloride [Ditropan -] 5 mg PO DAILY 01/10/19 


Selenium 25 mcg PO ASDIR 01/10/19 


Tamsulosin HCl [Flomax] 0.4 mg PO DAILY #7 cap.er.24h 01/10/19 


Tramadol HCl 50 mg PO BID #4 tablet MDD 2 01/10/19 











Physical Exam


Vital Signs: 


 Vital Signs











Temperature  98.0 F   01/19/19 07:14


 


Pulse Rate  75   01/19/19 07:14


 


Respiratory Rate  18   01/19/19 07:14


 


Blood Pressure  160/86   01/19/19 07:14


 


O2 Sat by Pulse Oximetry (%)  97   01/18/19 21:00











Constitutional: Yes: Well Nourished, No Distress


Eyes: Yes: Conjunctiva Clear


HENT: Yes: Atraumatic, Normocephalic


Neck: Yes: Trachea Midline.  No: Lymphadenopathy


Cardiovascular: Yes: Regular Rate and Rhythm, S1, S2.  No: Gallop, Murmur


Respiratory: Yes: Regular, CTA Bilaterally


Gastrointestinal: Yes: Normal Bowel Sounds, Soft.  No: Hepatomegaly, 

Splenomegaly


Extremities: No: Calf Tenderness


Edema: No


Peripheral Pulses WNL: Yes


Integumentary: No: Petechiae, Rash


Neurological: Yes: Alert, Oriented, Cran Nerves II-XII Intact.  No: Confusion


...Motor Strength: WNL


Psychiatric: Yes: Alert, Oriented


Labs: 


 CBC, BMP





 01/17/19 07:00 





 01/17/19 07:00 











Assessment/Plan





Patient admitted with renal stone and urosepsis, now improved, with 

incidentally noted pancreatic mass.


Patient provides a very clear history that this mass has been extensively 

investigated, and biopsied, within the last 3 months at the [facility].


Recommend obtaining records from the [place] prior to any further investigation 

of this mass.


Kindly reconsult if current findings, as compared with findings in [facility] 

the [facility], suggest in any way progression, or any other reason to suspect that the 

prior evaluation of benign mass need be questioned.

## 2019-01-19 NOTE — PN
Progress Note, Physician


History of Present Illness: 





Pt states he is feeling well. Labs noted, leukocytosis resolved, patient 

afebrile. Currently he has no specific complaints.





- Current Medication List


Current Medications: 


Active Medications





Acetaminophen (Tylenol -)  650 mg PO Q6H PRN


   PRN Reason: FEVER


   Last Admin: 01/16/19 14:55 Dose:  650 mg


Vancomycin HCl 1,250 mg/ (Dextrose)  250 mls @ 166.667 mls/hr IVPB Q12H NAVYA; 

Protocol


   Last Admin: 01/19/19 01:45 Dose:  166.667 mls/hr


Polyethylene Glycol (Miralax (For Daily Use) -)  17 gm PO DAILY PRN


   PRN Reason: CONSTIPATION











- Objective


Vital Signs: 


 Vital Signs











Temperature  98.0 F   01/19/19 07:14


 


Pulse Rate  75   01/19/19 07:14


 


Respiratory Rate  18   01/19/19 07:14


 


Blood Pressure  160/86   01/19/19 07:14


 


O2 Sat by Pulse Oximetry (%)  97   01/18/19 21:00











Constitutional: Yes: No Distress, Calm


Cardiovascular: Yes: Regular Rate and Rhythm


Respiratory: Yes: Regular


Gastrointestinal: Yes: Normal Bowel Sounds, Soft


Integumentary: Yes: WNL


Neurological: Yes: Alert


Labs: 


 CBC, BMP





 01/17/19 07:00 





 01/17/19 07:00 





 INR, PTT











INR  1.38  (0.83-1.09)  H  01/12/19  16:30    








 Laboratory Tests











  01/12/19 01/12/19 01/12/19





  16:30 16:30 16:30


 


WBC  17.3 H  


 


RBC  5.23  


 


Hgb  15.3  


 


Hct  43.9  


 


MCV  84.0  


 


MCH  29.2  


 


MCHC  34.8  


 


RDW  17.3 H  


 


Plt Count  156  D  


 


MPV  8.8  


 


Absolute Neuts (auto)  16.1 H  


 


Neutrophils %  93.2 H  


 


Neutrophils % (Manual)  79.0  


 


Band Neutrophils %  13.0  


 


Lymphocytes %  2.2 L D  


 


Lymphocytes % (Manual)  6.0 L  


 


Monocytes %  4.2  


 


Monocytes % (Manual)  2 L  


 


Eosinophils %  0.1  D  


 


Eosinophils % (Manual)  0.0  


 


Basophils %  0.3  


 


Basophils % (Manual)  0.0  


 


Myelocytes % (Man)   


 


Promyelocytes % (Man)   


 


Blast Cells % (Manual)   


 


Nucleated RBC %  0  


 


Metamyelocytes   


 


Hypochromia   


 


Platelet Estimate  Adequate  


 


Polychromasia   


 


Poikilocytosis   


 


Anisocytosis   


 


Microcytosis   


 


Macrocytosis   


 


PT with INR   


 


INR   


 


PTT (Actin FS)    25.5


 


Sodium   Cancelled 


 


Potassium   Cancelled 


 


Chloride   Cancelled 


 


Carbon Dioxide   Cancelled 


 


Anion Gap   Cancelled 


 


BUN   Cancelled 


 


Creatinine   Cancelled 


 


Creat Clearance w eGFR   Cancelled 


 


Random Glucose   Cancelled 


 


Lactic Acid   


 


Calcium   Cancelled 


 


Magnesium   Cancelled 


 


Total Bilirubin   Cancelled 


 


AST   Cancelled 


 


ALT   Cancelled 


 


Alkaline Phosphatase   Cancelled 


 


Creatine Kinase   


 


Creatine Kinase Index   


 


CK-MB (CK-2)   


 


Troponin I   Cancelled 


 


Total Protein   Cancelled 


 


Albumin   Cancelled 


 


Total Amylase   


 


Lipase   


 


Carcinoembryonic Ag   


 


CA 19-9 Antigen   


 


Urine Color   


 


Urine Appearance   


 


Urine pH   


 


Ur Specific Gravity   


 


Urine Protein   


 


Urine Glucose (UA)   


 


Urine Ketones   


 


Urine Blood   


 


Urine Nitrite   


 


Urine Bilirubin   


 


Urine Urobilinogen   


 


Ur Leukocyte Esterase   


 


Urine WBC (Auto)   


 


Urine RBC (Auto)   


 


Ur Epithelial Cells   


 


Urine Bacteria   


 


Urine Mucus   


 


Vancomycin Pre-Dose   


 


Influenza A (Rapid)   


 


Influenza B (Rapid)   














  01/12/19 01/12/19 01/12/19





  16:30 16:30 17:10


 


WBC   


 


RBC   


 


Hgb   


 


Hct   


 


MCV   


 


MCH   


 


MCHC   


 


RDW   


 


Plt Count   


 


MPV   


 


Absolute Neuts (auto)   


 


Neutrophils %   


 


Neutrophils % (Manual)   


 


Band Neutrophils %   


 


Lymphocytes %   


 


Lymphocytes % (Manual)   


 


Monocytes %   


 


Monocytes % (Manual)   


 


Eosinophils %   


 


Eosinophils % (Manual)   


 


Basophils %   


 


Basophils % (Manual)   


 


Myelocytes % (Man)   


 


Promyelocytes % (Man)   


 


Blast Cells % (Manual)   


 


Nucleated RBC %   


 


Metamyelocytes   


 


Hypochromia   


 


Platelet Estimate   


 


Polychromasia   


 


Poikilocytosis   


 


Anisocytosis   


 


Microcytosis   


 


Macrocytosis   


 


PT with INR   16.30 H 


 


INR   1.38 H 


 


PTT (Actin FS)   


 


Sodium   


 


Potassium   


 


Chloride   


 


Carbon Dioxide   


 


Anion Gap   


 


BUN   


 


Creatinine   


 


Creat Clearance w eGFR   


 


Random Glucose   


 


Lactic Acid  2.8 H*  


 


Calcium   


 


Magnesium   


 


Total Bilirubin   


 


AST   


 


ALT   


 


Alkaline Phosphatase   


 


Creatine Kinase   


 


Creatine Kinase Index   


 


CK-MB (CK-2)   


 


Troponin I   


 


Total Protein   


 


Albumin   


 


Total Amylase   


 


Lipase   


 


Carcinoembryonic Ag   


 


CA 19-9 Antigen   


 


Urine Color    Yellow


 


Urine Appearance    Cloudy


 


Urine pH    5.0  D


 


Ur Specific Gravity    1.021


 


Urine Protein    2+ H


 


Urine Glucose (UA)    Negative


 


Urine Ketones    2+ H


 


Urine Blood    2+ H


 


Urine Nitrite    Negative


 


Urine Bilirubin    Negative


 


Urine Urobilinogen    2.0


 


Ur Leukocyte Esterase    Trace


 


Urine WBC (Auto)    12


 


Urine RBC (Auto)    1


 


Ur Epithelial Cells    Rare


 


Urine Bacteria    Rare


 


Urine Mucus    Rare


 


Vancomycin Pre-Dose   


 


Influenza A (Rapid)   


 


Influenza B (Rapid)   














  01/12/19 01/12/19 01/12/19





  17:30 17:56 20:27


 


WBC   


 


RBC   


 


Hgb   


 


Hct   


 


MCV   


 


MCH   


 


MCHC   


 


RDW   


 


Plt Count   


 


MPV   


 


Absolute Neuts (auto)   


 


Neutrophils %   


 


Neutrophils % (Manual)   


 


Band Neutrophils %   


 


Lymphocytes %   


 


Lymphocytes % (Manual)   


 


Monocytes %   


 


Monocytes % (Manual)   


 


Eosinophils %   


 


Eosinophils % (Manual)   


 


Basophils %   


 


Basophils % (Manual)   


 


Myelocytes % (Man)   


 


Promyelocytes % (Man)   


 


Blast Cells % (Manual)   


 


Nucleated RBC %   


 


Metamyelocytes   


 


Hypochromia   


 


Platelet Estimate   


 


Polychromasia   


 


Poikilocytosis   


 


Anisocytosis   


 


Microcytosis   


 


Macrocytosis   


 


PT with INR   


 


INR   


 


PTT (Actin FS)   


 


Sodium   138 


 


Potassium   4.0 


 


Chloride   106 


 


Carbon Dioxide   20 L 


 


Anion Gap   12 


 


BUN   28 H 


 


Creatinine   2.0 H 


 


Creat Clearance w eGFR   33.39 


 


Random Glucose   147 H 


 


Lactic Acid    1.2


 


Calcium   8.1 L 


 


Magnesium   


 


Total Bilirubin   0.9 


 


AST   45 H 


 


ALT   28 


 


Alkaline Phosphatase   95 


 


Creatine Kinase   231 


 


Creatine Kinase Index   0.4 


 


CK-MB (CK-2)   < 1.0 


 


Troponin I   < 0.02 


 


Total Protein   6.1 L 


 


Albumin   2.9 L 


 


Total Amylase   63 


 


Lipase   36 L 


 


Carcinoembryonic Ag   


 


CA 19-9 Antigen   


 


Urine Color   


 


Urine Appearance   


 


Urine pH   


 


Ur Specific Gravity   


 


Urine Protein   


 


Urine Glucose (UA)   


 


Urine Ketones   


 


Urine Blood   


 


Urine Nitrite   


 


Urine Bilirubin   


 


Urine Urobilinogen   


 


Ur Leukocyte Esterase   


 


Urine WBC (Auto)   


 


Urine RBC (Auto)   


 


Ur Epithelial Cells   


 


Urine Bacteria   


 


Urine Mucus   


 


Vancomycin Pre-Dose   


 


Influenza A (Rapid)  Negative  


 


Influenza B (Rapid)  Negative  














  01/13/19 01/13/19 01/14/19





  09:30 09:30 06:30


 


WBC  14.6 H   10.5 H


 


RBC  4.52   4.72


 


Hgb  13.2   12.8


 


Hct  37.9   40.1


 


MCV  83.9   84.9


 


MCH  29.1   27.1


 


MCHC  34.7   31.9 L


 


RDW  17.0 H   16.8 H


 


Plt Count  142   127 L


 


MPV  8.9   8.6


 


Absolute Neuts (auto)  13.0 H   8.2 H


 


Neutrophils %  89.2 H   78.4


 


Neutrophils % (Manual)   


 


Band Neutrophils %   


 


Lymphocytes %  4.8 L D   10.2  D


 


Lymphocytes % (Manual)   


 


Monocytes %  5.6   10.7 H D


 


Monocytes % (Manual)   


 


Eosinophils %  0.2  D   0.5  D


 


Eosinophils % (Manual)   


 


Basophils %  0.2   0.2


 


Basophils % (Manual)   


 


Myelocytes % (Man)   


 


Promyelocytes % (Man)   


 


Blast Cells % (Manual)   


 


Nucleated RBC %  0   0


 


Metamyelocytes   


 


Hypochromia   


 


Platelet Estimate   


 


Polychromasia   


 


Poikilocytosis   


 


Anisocytosis   


 


Microcytosis   


 


Macrocytosis   


 


PT with INR   


 


INR   


 


PTT (Actin FS)   


 


Sodium   142 


 


Potassium   3.8 


 


Chloride   112 H 


 


Carbon Dioxide   22 


 


Anion Gap   8 


 


BUN   20 H 


 


Creatinine   1.6 H 


 


Creat Clearance w eGFR   43.20 


 


Random Glucose   130 H 


 


Lactic Acid   


 


Calcium   7.9 L 


 


Magnesium   


 


Total Bilirubin   1.1 H 


 


AST   39 H 


 


ALT   28 


 


Alkaline Phosphatase   90 


 


Creatine Kinase   


 


Creatine Kinase Index   


 


CK-MB (CK-2)   


 


Troponin I   


 


Total Protein   5.6 L 


 


Albumin   2.7 L 


 


Total Amylase   


 


Lipase   


 


Carcinoembryonic Ag   


 


CA 19-9 Antigen   


 


Urine Color   


 


Urine Appearance   


 


Urine pH   


 


Ur Specific Gravity   


 


Urine Protein   


 


Urine Glucose (UA)   


 


Urine Ketones   


 


Urine Blood   


 


Urine Nitrite   


 


Urine Bilirubin   


 


Urine Urobilinogen   


 


Ur Leukocyte Esterase   


 


Urine WBC (Auto)   


 


Urine RBC (Auto)   


 


Ur Epithelial Cells   


 


Urine Bacteria   


 


Urine Mucus   


 


Vancomycin Pre-Dose   


 


Influenza A (Rapid)   


 


Influenza B (Rapid)   














  01/14/19 01/15/19 01/15/19





  06:30 06:00 06:00


 


WBC   14.8 H 


 


RBC   4.77 


 


Hgb   13.8 


 


Hct   40.0 


 


MCV   83.9 


 


MCH   29.0 


 


MCHC   34.6 


 


RDW   17.0 H 


 


Plt Count   179  D 


 


MPV   9.1 


 


Absolute Neuts (auto)   11.9 H 


 


Neutrophils %   80.0 


 


Neutrophils % (Manual)   


 


Band Neutrophils %   


 


Lymphocytes %   11.5 


 


Lymphocytes % (Manual)   


 


Monocytes %   8.4 


 


Monocytes % (Manual)   


 


Eosinophils %   0.0  D 


 


Eosinophils % (Manual)   


 


Basophils %   0.1 


 


Basophils % (Manual)   


 


Myelocytes % (Man)   


 


Promyelocytes % (Man)   


 


Blast Cells % (Manual)   


 


Nucleated RBC %   0 


 


Metamyelocytes   


 


Hypochromia   


 


Platelet Estimate   


 


Polychromasia   


 


Poikilocytosis   


 


Anisocytosis   


 


Microcytosis   


 


Macrocytosis   


 


PT with INR   


 


INR   


 


PTT (Actin FS)   


 


Sodium  141   141


 


Potassium  3.3 L   3.6


 


Chloride  109 H   109 H


 


Carbon Dioxide  20 L   21


 


Anion Gap  11   11


 


BUN  13   17


 


Creatinine  1.1   1.2


 


Creat Clearance w eGFR  > 60   > 60


 


Random Glucose  135 H   122 H


 


Lactic Acid   


 


Calcium  8.2 L   8.4 L


 


Magnesium   


 


Total Bilirubin  1.0   0.7


 


AST  60 H   36


 


ALT  45   48


 


Alkaline Phosphatase  104   104


 


Creatine Kinase   


 


Creatine Kinase Index   


 


CK-MB (CK-2)   


 


Troponin I   


 


Total Protein  6.0 L   6.5


 


Albumin  2.8 L   3.0 L


 


Total Amylase   


 


Lipase   


 


Carcinoembryonic Ag   


 


CA 19-9 Antigen   


 


Urine Color   


 


Urine Appearance   


 


Urine pH   


 


Ur Specific Gravity   


 


Urine Protein   


 


Urine Glucose (UA)   


 


Urine Ketones   


 


Urine Blood   


 


Urine Nitrite   


 


Urine Bilirubin   


 


Urine Urobilinogen   


 


Ur Leukocyte Esterase   


 


Urine WBC (Auto)   


 


Urine RBC (Auto)   


 


Ur Epithelial Cells   


 


Urine Bacteria   


 


Urine Mucus   


 


Vancomycin Pre-Dose   


 


Influenza A (Rapid)   


 


Influenza B (Rapid)   














  01/16/19 01/16/19 01/16/19





  13:23 13:23 13:23


 


WBC   


 


RBC   


 


Hgb   


 


Hct   


 


MCV   


 


MCH   


 


MCHC   


 


RDW   


 


Plt Count   


 


MPV   


 


Absolute Neuts (auto)   


 


Neutrophils %   


 


Neutrophils % (Manual)   


 


Band Neutrophils %   


 


Lymphocytes %   


 


Lymphocytes % (Manual)   


 


Monocytes %   


 


Monocytes % (Manual)   


 


Eosinophils %   


 


Eosinophils % (Manual)   


 


Basophils %   


 


Basophils % (Manual)   


 


Myelocytes % (Man)   


 


Promyelocytes % (Man)   


 


Blast Cells % (Manual)   


 


Nucleated RBC %   


 


Metamyelocytes   


 


Hypochromia   


 


Platelet Estimate   


 


Polychromasia   


 


Poikilocytosis   


 


Anisocytosis   


 


Microcytosis   


 


Macrocytosis   


 


PT with INR   


 


INR   


 


PTT (Actin FS)   


 


Sodium   


 


Potassium   


 


Chloride   


 


Carbon Dioxide   


 


Anion Gap   


 


BUN   


 


Creatinine   


 


Creat Clearance w eGFR   


 


Random Glucose   


 


Lactic Acid   


 


Calcium   


 


Magnesium   


 


Total Bilirubin   


 


AST   


 


ALT   


 


Alkaline Phosphatase   


 


Creatine Kinase   


 


Creatine Kinase Index   


 


CK-MB (CK-2)   


 


Troponin I   


 


Total Protein   


 


Albumin   


 


Total Amylase   


 


Lipase   


 


Carcinoembryonic Ag    2.0


 


CA 19-9 Antigen   23 


 


Urine Color   


 


Urine Appearance   


 


Urine pH   


 


Ur Specific Gravity   


 


Urine Protein   


 


Urine Glucose (UA)   


 


Urine Ketones   


 


Urine Blood   


 


Urine Nitrite   


 


Urine Bilirubin   


 


Urine Urobilinogen   


 


Ur Leukocyte Esterase   


 


Urine WBC (Auto)   


 


Urine RBC (Auto)   


 


Ur Epithelial Cells   


 


Urine Bacteria   


 


Urine Mucus   


 


Vancomycin Pre-Dose  10.2 L  


 


Influenza A (Rapid)   


 


Influenza B (Rapid)   














  01/17/19 01/17/19





  07:00 07:00


 


WBC  11.8 H 


 


RBC  4.50 


 


Hgb  12.9 


 


Hct  38.2 


 


MCV  84.7 


 


MCH  28.7 


 


MCHC  33.8 


 


RDW  17.3 H 


 


Plt Count  230  D 


 


MPV  8.3 


 


Absolute Neuts (auto)  7.2 


 


Neutrophils %  61.4  D 


 


Neutrophils % (Manual)  63.0  D 


 


Band Neutrophils %  0.0 


 


Lymphocytes %  23.5  D 


 


Lymphocytes % (Manual)  20.0  D 


 


Monocytes %  11.2 H 


 


Monocytes % (Manual)  8  D 


 


Eosinophils %  3.3  D 


 


Eosinophils % (Manual)  4.0  D 


 


Basophils %  0.6  D 


 


Basophils % (Manual)  1.0  D 


 


Myelocytes % (Man)  0 


 


Promyelocytes % (Man)  0 


 


Blast Cells % (Manual)  0 


 


Nucleated RBC %  0 


 


Metamyelocytes  0 


 


Hypochromia  0 


 


Platelet Estimate  Normal 


 


Polychromasia  0 


 


Poikilocytosis  0 


 


Anisocytosis  0 


 


Microcytosis  0 


 


Macrocytosis  0 


 


PT with INR  


 


INR  


 


PTT (Actin FS)  


 


Sodium   141


 


Potassium   3.6


 


Chloride   108 H


 


Carbon Dioxide   23


 


Anion Gap   10


 


BUN   11


 


Creatinine   0.9


 


Creat Clearance w eGFR   > 60


 


Random Glucose   96


 


Lactic Acid  


 


Calcium   7.9 L


 


Magnesium  


 


Total Bilirubin   0.8


 


AST   17


 


ALT   31


 


Alkaline Phosphatase   88


 


Creatine Kinase  


 


Creatine Kinase Index  


 


CK-MB (CK-2)  


 


Troponin I  


 


Total Protein   6.0 L


 


Albumin   2.7 L


 


Total Amylase  


 


Lipase  


 


Carcinoembryonic Ag  


 


CA 19-9 Antigen  


 


Urine Color  


 


Urine Appearance  


 


Urine pH  


 


Ur Specific Gravity  


 


Urine Protein  


 


Urine Glucose (UA)  


 


Urine Ketones  


 


Urine Blood  


 


Urine Nitrite  


 


Urine Bilirubin  


 


Urine Urobilinogen  


 


Ur Leukocyte Esterase  


 


Urine WBC (Auto)  


 


Urine RBC (Auto)  


 


Ur Epithelial Cells  


 


Urine Bacteria  


 


Urine Mucus  


 


Vancomycin Pre-Dose  


 


Influenza A (Rapid)  


 


Influenza B (Rapid)  














Problem List





- Problems


(1) Fever


Code(s): R50.9 - FEVER, UNSPECIFIED   





(2) Kidney stone


Code(s): N20.0 - CALCULUS OF KIDNEY   





(3) Pancreatic mass


Code(s): K86.9 - DISEASE OF PANCREAS, UNSPECIFIED   





(4) HTN (hypertension)


Code(s): I10 - ESSENTIAL (PRIMARY) HYPERTENSION   





Assessment/Plan





UTI/Obstructing renal stone s/p stent placement


Gram Positive Bacteremia





-- Zosyn d/c'd, continue Vancomycin. Still awaiting initial blood culture 

results


-- repeat blood cultures no growth in 24hrs


-- GI following for workup of pancreatic mass


Pt afebrile, wbc trend downwards

## 2019-01-20 RX ADMIN — VANCOMYCIN HYDROCHLORIDE SCH MLS/HR: 1 INJECTION, POWDER, LYOPHILIZED, FOR SOLUTION INTRAVENOUS at 16:49

## 2019-01-20 RX ADMIN — VANCOMYCIN HYDROCHLORIDE SCH: 1 INJECTION, POWDER, LYOPHILIZED, FOR SOLUTION INTRAVENOUS at 14:54

## 2019-01-20 RX ADMIN — VANCOMYCIN HYDROCHLORIDE SCH MLS/HR: 1 INJECTION, POWDER, LYOPHILIZED, FOR SOLUTION INTRAVENOUS at 01:25

## 2019-01-20 NOTE — PN
Progress Note, Physician


Chief Complaint: 





pt lyin bed,pt feels better


urosepsis


S/p rt ureteral stent placement,cystoscopy,rt retrograde pyelogram


afebrile


Vitals stable


gm positive bacteremia,rpt blood cul negative


Pt is on IV fluid,antibiotics and pain meds


MRI of abdomen with MRCP shows4.6x2.6 cm mass in Pancreatic body proximal tail 

with distal pacreatic atrophy and ductal dialation hihgly suspicious for 

neoplastic process


some perjpancreatic lymphnode


,simple renal cyst


oncology cosult apprecited,rec to get old records











- Current Medication List


Current Medications: 


Active Medications





Acetaminophen (Tylenol -)  650 mg PO Q6H PRN


   PRN Reason: FEVER


   Last Admin: 01/16/19 14:55 Dose:  650 mg


Vancomycin HCl 1,250 mg/ (Dextrose)  250 mls @ 166.667 mls/hr IVPB Q12H NAVYA; 

Protocol


   Last Admin: 01/20/19 01:25 Dose:  166.667 mls/hr


Polyethylene Glycol (Miralax (For Daily Use) -)  17 gm PO DAILY PRN


   PRN Reason: CONSTIPATION











- Objective


Vital Signs: 


 Vital Signs











Temperature  97.7 F   01/20/19 08:55


 


Pulse Rate  68   01/20/19 08:55


 


Respiratory Rate  20   01/20/19 08:55


 


Blood Pressure  131/72   01/20/19 08:55


 


O2 Sat by Pulse Oximetry (%)  98   01/19/19 21:00











Constitutional: Yes: No Distress


Eyes: Yes: Conjunctiva Clear


HENT: Yes: Atraumatic


Neck: Yes: Supple


Cardiovascular: Yes: Regular Rate and Rhythm


Respiratory: Yes: Regular, CTA Bilaterally


Gastrointestinal: Yes: Normal Bowel Sounds


Musculoskeletal: Yes: WNL


Extremities: Yes: WNL


Edema: No


Peripheral Pulses WNL: Yes


Neurological: Yes: WNL, Alert


...Motor Strength: WNL


Psychiatric: Yes: WNL


Labs: 


 CBC, BMP





 01/17/19 07:00 





 01/17/19 07:00 





 INR, PTT











INR  1.38  (0.83-1.09)  H  01/12/19  16:30    














Assessment/Plan


S/p cystoscopy,rt retrograde pyelogram,rt ureteral stent placement


urosepsis


 Rt ureteric stone ,fever,UTI


Leucocytosis coming down


Pancreatic mass


CEA2,[id number]


gm positive bacteremia


HTN


PLAN


 continue antibiotics,IV fluid and pain MEDS


UROLOGY f/u


ID f/u


Gi f/u


oncology f/u

## 2019-01-20 NOTE — PN
Progress Note, Physician


History of Present Illness: 





Pt states he feels well. Occasional pain with urination, no hematuria, no lower 

abd pain. 





- Current Medication List


Current Medications: 


Active Medications





Acetaminophen (Tylenol -)  650 mg PO Q6H PRN


   PRN Reason: FEVER


   Last Admin: 01/16/19 14:55 Dose:  650 mg


Vancomycin HCl 1,250 mg/ (Dextrose)  250 mls @ 166.667 mls/hr IVPB Q12H NAVYA; 

Protocol


   Last Admin: 01/20/19 14:54 Dose:  Not Given


Polyethylene Glycol (Miralax (For Daily Use) -)  17 gm PO DAILY PRN


   PRN Reason: CONSTIPATION











- Objective


Vital Signs: 


 Vital Signs











Temperature  97.8 F   01/20/19 14:20


 


Pulse Rate  69   01/20/19 14:20


 


Respiratory Rate  18   01/20/19 14:20


 


Blood Pressure  127/84   01/20/19 14:20


 


O2 Sat by Pulse Oximetry (%)  98   01/19/19 21:00











Constitutional: Yes: No Distress, Calm


Cardiovascular: Yes: Regular Rate and Rhythm


Respiratory: Yes: Regular


Gastrointestinal: Yes: Normal Bowel Sounds, Soft


Edema: No


Integumentary: Yes: WNL


Neurological: Yes: Alert, Oriented


Labs: 


 CBC, BMP





 01/17/19 07:00 





 01/17/19 07:00 





 INR, PTT











INR  1.38  (0.83-1.09)  H  01/12/19  16:30    








 Microbiology





01/12/19 16:30   Blood - Peripheral Venous   Blood Culture - Preliminary


                            Staphylococcus Lugdunensis


                            Staphylococcus Coagulase Neg


01/16/19 06:20   Blood - Peripheral Venous   Blood Culture - Preliminary


                            NO GROWTH OBTAINED AFTER 96 HOURS, INCUBATION TO 

CONTINUE


                            FOR 1 DAYS.


01/16/19 06:15   Blood - Peripheral Venous   Blood Culture - Preliminary


                            NO GROWTH OBTAINED AFTER 96 HOURS, INCUBATION TO 

CONTINUE


                            FOR 1 DAYS.


01/12/19 17:00   Blood - Peripheral Venous   Blood Culture - Preliminary


                            Gram Positive Cocci


                            Gram Positive Cocci#2


01/14/19 13:03   Urine - Urine Clean Catch   Urine Culture - Final


                            NO GROWTH OBTAINED


01/12/19 17:10   Urine - Urine Clean Catch   Urine Culture - Final











Problem List





- Problems


(1) Fever


Code(s): R50.9 - FEVER, UNSPECIFIED   





(2) Kidney stone


Code(s): N20.0 - CALCULUS OF KIDNEY   





(3) Pancreatic mass


Code(s): K86.9 - DISEASE OF PANCREAS, UNSPECIFIED   





(4) HTN (hypertension)


Code(s): I10 - ESSENTIAL (PRIMARY) HYPERTENSION   





Assessment/Plan





UTI/Pyelonephritis


Obstructing renal stone s/p stent placement


Gram Positive Bacteremia - Staph lugdunensis/Coag neg Staph isolated


Pancreatic mass





-- Continue Vancomycin IV


-- suggest Echo 


-- repeat blood cultures no growth


-- leukocytosis/fever resolved

## 2019-01-21 RX ADMIN — VANCOMYCIN HYDROCHLORIDE SCH MLS/HR: 1 INJECTION, POWDER, LYOPHILIZED, FOR SOLUTION INTRAVENOUS at 01:11

## 2019-01-21 RX ADMIN — VANCOMYCIN HYDROCHLORIDE SCH: 1 INJECTION, POWDER, LYOPHILIZED, FOR SOLUTION INTRAVENOUS at 23:18

## 2019-01-21 NOTE — PN
Progress Note, Physician


History of Present Illness: 





patient stable


no new issues





- Current Medication List


Current Medications: 


Active Medications





Acetaminophen (Tylenol -)  650 mg PO Q6H PRN


   PRN Reason: FEVER


   Last Admin: 01/16/19 14:55 Dose:  650 mg


Vancomycin HCl 1,250 mg/ (Dextrose)  250 mls @ 166.667 mls/hr IVPB Q12H NAVYA; 

Protocol


   Last Admin: 01/21/19 01:11 Dose:  166.667 mls/hr


Polyethylene Glycol (Miralax (For Daily Use) -)  17 gm PO DAILY PRN


   PRN Reason: CONSTIPATION


   Last Admin: 01/21/19 10:19 Dose:  17 grams











- Objective


Vital Signs: 


 Vital Signs











Temperature  98.8 F   01/21/19 10:15


 


Pulse Rate  68   01/21/19 10:15


 


Respiratory Rate  18   01/21/19 10:15


 


Blood Pressure  141/84   01/21/19 10:15


 


O2 Sat by Pulse Oximetry (%)  98   01/21/19 10:15











Constitutional: Yes: No Distress, Calm


Cardiovascular: Yes: Regular Rate and Rhythm


Respiratory: Yes: Regular, CTA Bilaterally


Gastrointestinal: Yes: Normal Bowel Sounds, Soft


Musculoskeletal: Yes: WNL


Extremities: Yes: WNL


Neurological: Yes: Alert, Oriented


Psychiatric: Yes: Alert, Oriented


Labs: 


 CBC, BMP





 01/17/19 07:00 





 01/17/19 07:00 





 INR, PTT











INR  1.38  (0.83-1.09)  H  01/12/19  16:30    














Assessment/Plan





uti


renal stone


gm positive bactermia


pancreatic mass


rt pyelo








plan


continue vanco


will check vanco trough


will need vanco for another 10 more days


rest as per the team

## 2019-01-21 NOTE — PN
Progress Note, Physician


Chief Complaint: 





pt lyin bed,pt feels better


urosepsis


S/p rt ureteral stent placement,cystoscopy,rt retrograde pyelogram


afebrile


Vitals stable


blood cul shows staph coagulase negative,rpt blood cul negative


Pt is on IV fluid,antibiotics and pain meds


MRI of abdomen shows pacreatic mass


oncology cosult apprecited,rec to get old records











- Current Medication List


Current Medications: 


Active Medications





Acetaminophen (Tylenol -)  650 mg PO Q6H PRN


   PRN Reason: FEVER


   Last Admin: 01/16/19 14:55 Dose:  650 mg


Vancomycin HCl 1,250 mg/ (Dextrose)  250 mls @ 166.667 mls/hr IVPB Q12H NAVYA; 

Protocol


   Last Admin: 01/21/19 01:11 Dose:  166.667 mls/hr


Polyethylene Glycol (Miralax (For Daily Use) -)  17 gm PO DAILY PRN


   PRN Reason: CONSTIPATION


   Last Admin: 01/21/19 10:19 Dose:  17 grams











- Objective


Vital Signs: 


 Vital Signs











Temperature  98.8 F   01/21/19 10:15


 


Pulse Rate  68   01/21/19 10:15


 


Respiratory Rate  18   01/21/19 10:15


 


Blood Pressure  141/84   01/21/19 10:15


 


O2 Sat by Pulse Oximetry (%)  98   01/20/19 21:00











Constitutional: Yes: No Distress


Eyes: Yes: Conjunctiva Clear


HENT: Yes: Atraumatic


Neck: Yes: Supple, Trachea Midline


Cardiovascular: Yes: Regular Rate and Rhythm


Respiratory: Yes: Regular, CTA Bilaterally


Gastrointestinal: Yes: Normal Bowel Sounds, Soft


Musculoskeletal: Yes: WNL


Extremities: Yes: WNL


Edema: No


Peripheral Pulses WNL: Yes


Integumentary: Yes: WNL


Neurological: Yes: WNL, Alert


Psychiatric: Yes: WNL, Alert


Labs: 


 CBC, BMP





 01/17/19 07:00 





 01/17/19 07:00 





 INR, PTT











INR  1.38  (0.83-1.09)  H  01/12/19  16:30    














Assessment/Plan


S/p cystoscopy,rt retrograde pyelogram,rt ureteral stent placement


urosepsis


 Rt ureteric stone ,fever,UTI


Leucocytosis coming down


Pancreatic mass


[id number]


HTN


PLAN


 continue antibiotics,IV fluid and pain MEDS


UROLOGY f/u


ID f/u


Gi f/u


oncology f/u


awaiting from record from [facility]

## 2019-01-21 NOTE — PN
Progress Note (short form)





- Note


Progress Note: 





Patient seen and examined


no c/o





 Last Vital Signs











Temp Pulse Resp BP Pulse Ox


 


 97.9 F   83   18   120/86   98 


 


 01/21/19 14:57  01/21/19 14:57  01/21/19 14:57  01/21/19 14:57  01/21/19 10:15








Cor: RSR, No murmurs, No gallops


Lungs: Clear to P&A


Abd: Soft, Normal bowel sounds, No organomegaly


Ext:No significant edema








Labs/Meds reviewed








A/P





67 y/o with  pancreatic body/proximal tail 4.4cm mass, with distal atrophy and 

ductal dilatation on MRCP suspicious for neoplasm.


Will need EUS 


CA 19.9 normal





reports [facility]x [facility] in [facility]--? benign --team was planning on repeat bx


will need records from [facility]


will need repat w/u for mass--EUS/biopsy


will need f/u at [facility] with Dr. Conde

## 2019-01-22 LAB
ALBUMIN SERPL-MCNC: 3 G/DL (ref 3.4–5)
ALP SERPL-CCNC: 81 U/L (ref 45–117)
ALT SERPL-CCNC: 26 U/L (ref 13–61)
ANION GAP SERPL CALC-SCNC: 9 MMOL/L (ref 8–16)
AST SERPL-CCNC: 15 U/L (ref 15–37)
BASOPHILS # BLD: 0.8 % (ref 0–2)
BILIRUB SERPL-MCNC: 0.4 MG/DL (ref 0.2–1)
BUN SERPL-MCNC: 18 MG/DL (ref 7–18)
CALCIUM SERPL-MCNC: 8.6 MG/DL (ref 8.5–10.1)
CHLORIDE SERPL-SCNC: 106 MMOL/L (ref 98–107)
CO2 SERPL-SCNC: 23 MMOL/L (ref 21–32)
CREAT SERPL-MCNC: 1 MG/DL (ref 0.55–1.3)
DEPRECATED RDW RBC AUTO: 17.2 % (ref 11.9–15.9)
EOSINOPHIL # BLD: 2.9 % (ref 0–4.5)
GLUCOSE SERPL-MCNC: 114 MG/DL (ref 74–106)
HCT VFR BLD CALC: 36.5 % (ref 35.4–49)
HGB BLD-MCNC: 12.2 GM/DL (ref 11.7–16.9)
LYMPHOCYTES # BLD: 16.3 % (ref 8–40)
MCH RBC QN AUTO: 28.4 PG (ref 25.7–33.7)
MCHC RBC AUTO-ENTMCNC: 33.5 G/DL (ref 32–35.9)
MCV RBC: 84.6 FL (ref 80–96)
MONOCYTES # BLD AUTO: 5.5 % (ref 3.8–10.2)
NEUTROPHILS # BLD: 74.5 % (ref 42.8–82.8)
PLATELET # BLD AUTO: 353 K/MM3 (ref 134–434)
PMV BLD: 8 FL (ref 7.5–11.1)
POTASSIUM SERPLBLD-SCNC: 4.4 MMOL/L (ref 3.5–5.1)
PROT SERPL-MCNC: 6.4 G/DL (ref 6.4–8.2)
RBC # BLD AUTO: 4.32 M/MM3 (ref 4–5.6)
SODIUM SERPL-SCNC: 138 MMOL/L (ref 136–145)
WBC # BLD AUTO: 14.4 K/MM3 (ref 4–10)

## 2019-01-22 NOTE — PN
Progress Note, Physician


Chief Complaint: 





pt resting comfortably


afebrile,


pt is on IV antibiotics


oncology nte appreciated


blood cul on 1/12/19 shows stapinocencia lugdonesis





- Current Medication List


Current Medications: 


Active Medications





Acetaminophen (Tylenol -)  650 mg PO Q6H PRN


   PRN Reason: FEVER


   Last Admin: 01/16/19 14:55 Dose:  650 mg


Vancomycin HCl (Vancomycin (Pre-Docked))  1,000 mg in 250 mls @ 166.667 mls/hr 

IVPB DAILY@1700 [facility]


   Last Admin: 01/21/19 18:00 Dose:  166.667 mls/hr


Vancomycin HCl 1,250 mg/ (Dextrose)  250 mls @ 166.667 mls/hr IVPB DAILY@0500 

[facility]


   Last Admin: 01/22/19 05:07 Dose:  166.667 mls/hr


Polyethylene Glycol (Miralax (For Daily Use) -)  17 gm PO DAILY PRN


   PRN Reason: CONSTIPATION


   Last Admin: 01/21/19 10:19 Dose:  17 grams











- Objective


Vital Signs: 


 Vital Signs











Temperature  97.9 F   01/22/19 08:45


 


Pulse Rate  60   01/22/19 08:45


 


Respiratory Rate  20   01/22/19 08:45


 


Blood Pressure  157/92   01/22/19 08:45


 


O2 Sat by Pulse Oximetry (%)  98   01/21/19 21:00











Constitutional: Yes: No Distress


Eyes: Yes: Conjunctiva Clear


HENT: Yes: Atraumatic


Neck: Yes: Supple


Cardiovascular: Yes: Regular Rate and Rhythm


Respiratory: Yes: Regular, CTA Bilaterally


Gastrointestinal: Yes: Normal Bowel Sounds, Soft


Genitourinary: Yes: WNL


Musculoskeletal: Yes: WNL


Extremities: Yes: WNL


Edema: No


Peripheral Pulses WNL: Yes


Neurological: Yes: WNL, Alert


...Motor Strength: WNL


Psychiatric: Yes: WNL


Labs: 


 CBC, BMP





 01/22/19 06:45 





 01/22/19 06:45 





 INR, PTT











INR  1.38  (0.83-1.09)  H  01/12/19  16:30    














Assessment/Plan


CYSTOSCOPy,ureteric stent placement


 Rt ureteric stone ,fever,UTI


Leucocytosis


Pancreatic mass


HTN


PLAN


 continue iv antibiotics


ID f/u


Awaiting for records from [facility] regarding pancreatic mass biopsy report

## 2019-01-22 NOTE — PN
GI Progress Note


Subjective: 





Patient states feeling better.  Denies abdominal pain, nausea, vomiting.  

Denies diarrhea, rectal bleeding.





- Objective


Vital Signs: 


 Vital Signs











Temperature  97.9 F   01/22/19 08:45


 


Pulse Rate  60   01/22/19 08:45


 


Respiratory Rate  20   01/22/19 08:45


 


Blood Pressure  157/92   01/22/19 08:45


 


O2 Sat by Pulse Oximetry (%)  98   01/21/19 21:00











Constitutional: No Distress, Calm


Eyes: Yes: Conjunctiva Clear


Neck: Yes: Supple


Cardiovascular: Yes: Regular Rate and Rhythm


Respiratory: Yes: Regular, CTA Bilaterally


Gastrointestinal Inspection: No: WNL, Ascites, Distention, Hernia, Scars, Other


...Auscultate: Yes: Normoactive Bowel Sounds


...Palpate: Yes: Soft.  No: Firm/Rigid, Guarding, Hepatomegaly, Mass, Pulsatile 

Mass, Splenomegaly, Tenderness, Tenderness, Epigastium, Tenderness, Rebound, 

Other


...Percussion: Yes: Tympanitic.  No: Dullness, Fluid Wave, Other


Labs: 


 CBC, BMP





 01/22/19 06:45 





 01/22/19 06:45 





 INR, PTT











INR  1.38  (0.83-1.09)  H  01/12/19  16:30    








 Home Medication List











 Medication  Instructions  Recorded  Confirmed  Type


 


Amlodipine Besylate [Norvasc -] 10 mg PO DAILY 01/10/19 01/12/19 History


 


Diclofenac Potassium 50 mg PO ASDIR 01/10/19 01/12/19 History


 


Fluticasone Furoate [Children's 5.9 ml NS ASDIR 01/10/19 01/12/19 History





Flonase Sensimist]    


 


Loratadine 10 mg PO ASDIR 01/10/19 01/12/19 History


 


Omeprazole 20 mg PO ASDIR 01/10/19 01/12/19 History


 


Oxybutynin Chloride [Ditropan -] 5 mg PO DAILY 01/10/19 01/12/19 History


 


Selenium 25 mcg PO ASDIR 01/10/19 01/12/19 History








 Active Medications











Generic Name Dose Route Start Last Admin





  Trade Name Freq  PRN Reason Stop Dose Admin


 


Acetaminophen  650 mg  01/14/19 11:36  01/16/19 14:55





  Tylenol -  PO   650 mg





  Q6H PRN   Administration





  FEVER   





     





     





     


 


Vancomycin HCl  1,000 mg in 250 mls @ 166.667 mls/hr  01/23/19 05:00  





  Vancomycin (Pre-Docked)  IVPB   





  Q12H [facility]   





     





     





  Protocol   





     


 


Polyethylene Glycol  17 gm  01/16/19 10:24  01/21/19 10:19





  Miralax (For Daily Use) -  PO   17 grams





  DAILY PRN   Administration





  CONSTIPATION   





     





     





     














Problem List





- Problems


(1) Pancreatic mass


Assessment/Plan: 


 


-CA 19-9 and CEA wnl


-need to follow up with GI as outpatient for further workup and to schedule EUS/

biopsy


-from GI standpoint patient is clear for discharge


Code(s): K86.9 - DISEASE OF PANCREAS, UNSPECIFIED

## 2019-01-22 NOTE — PN
Progress Note, Physician


History of Present Illness: 





patient stable


no new issues





- Current Medication List


Current Medications: 


Active Medications





Acetaminophen (Tylenol -)  650 mg PO Q6H PRN


   PRN Reason: FEVER


   Last Admin: 01/16/19 14:55 Dose:  650 mg


Vancomycin HCl (Vancomycin (Pre-Docked))  1,000 mg in 250 mls @ 166.667 mls/hr 

IVPB DAILY@1700 [facility]


   Last Admin: 01/21/19 18:00 Dose:  166.667 mls/hr


Vancomycin HCl 1,250 mg/ (Dextrose)  250 mls @ 166.667 mls/hr IVPB DAILY@0500 

[facility]


   Last Admin: 01/22/19 05:07 Dose:  166.667 mls/hr


Polyethylene Glycol (Miralax (For Daily Use) -)  17 gm PO DAILY PRN


   PRN Reason: CONSTIPATION


   Last Admin: 01/21/19 10:19 Dose:  17 grams











- Objective


Vital Signs: 


 Vital Signs











Temperature  97.9 F   01/22/19 08:45


 


Pulse Rate  60   01/22/19 08:45


 


Respiratory Rate  20   01/22/19 08:45


 


Blood Pressure  157/92   01/22/19 08:45


 


O2 Sat by Pulse Oximetry (%)  98   01/21/19 21:00











Constitutional: Yes: No Distress, Calm


Cardiovascular: Yes: Regular Rate and Rhythm


Respiratory: Yes: Regular, CTA Bilaterally


Gastrointestinal: Yes: Normal Bowel Sounds, Soft


Musculoskeletal: Yes: WNL


Extremities: Yes: WNL


Neurological: Yes: Alert, Oriented


Psychiatric: Yes: Alert, Oriented


Labs: 


 CBC, BMP





 01/22/19 06:45 





 01/22/19 06:45 





 INR, PTT











INR  1.38  (0.83-1.09)  H  01/12/19  16:30    














Assessment/Plan





uti


renal stone


gm positive bactermia


pancreatic mass


rt pyelo








vanco trough noted


abx adjusted





patient needs another 9 days of vanco


spoke to family


rest as per the team

## 2019-01-23 RX ADMIN — VANCOMYCIN HYDROCHLORIDE SCH MLS/HR: 1 INJECTION, POWDER, LYOPHILIZED, FOR SOLUTION INTRAVENOUS at 18:29

## 2019-01-23 RX ADMIN — ACETAMINOPHEN PRN MG: 325 TABLET ORAL at 22:30

## 2019-01-23 RX ADMIN — VANCOMYCIN HYDROCHLORIDE SCH MLS/HR: 1 INJECTION, POWDER, LYOPHILIZED, FOR SOLUTION INTRAVENOUS at 04:22

## 2019-01-23 NOTE — PN
Progress Note, Physician


Chief Complaint: 





pt resting comfortably


afebrile,


GI and ID note appreciated


GI rec Out patient W/U for Pancreatic mass


pt is on IV antibiotics


oncology nte appreciated


blood cul on 1/12/19 shows staph lugdonesis


as per Id pt needs 9 more days of  IV antibiotics





- Current Medication List


Current Medications: 


Active Medications





Acetaminophen (Tylenol -)  650 mg PO Q6H PRN


   PRN Reason: FEVER


   Last Admin: 01/16/19 14:55 Dose:  650 mg


Vancomycin HCl (Vancomycin (Pre-Docked))  1,000 mg in 250 mls @ 166.667 mls/hr 

IVPB Q12H [facility]; Protocol


   Last Admin: 01/23/19 04:22 Dose:  166.667 mls/hr


Polyethylene Glycol (Miralax (For Daily Use) -)  17 gm PO DAILY PRN


   PRN Reason: CONSTIPATION


   Last Admin: 01/21/19 10:19 Dose:  17 grams











- Objective


Vital Signs: 


 Vital Signs











Temperature  98.4 F   01/23/19 06:00


 


Pulse Rate  78   01/23/19 06:00


 


Respiratory Rate  19 01/23/19 06:00


 


Blood Pressure  145/75   01/23/19 06:00


 


O2 Sat by Pulse Oximetry (%)  100   01/22/19 21:00











Constitutional: Yes: No Distress


Eyes: Yes: Conjunctiva Clear


HENT: Yes: Atraumatic


Neck: Yes: Supple


Cardiovascular: Yes: Regular Rate and Rhythm


Respiratory: Yes: Regular


Gastrointestinal: Yes: Normal Bowel Sounds, Soft


Musculoskeletal: Yes: WNL


Extremities: Yes: WNL


Edema: No


Peripheral Pulses WNL: Yes


Neurological: Yes: WNL, Alert


...Motor Strength: WNL


Labs: 


 CBC, BMP





 01/22/19 06:45 





 01/22/19 06:45 





 INR, PTT











INR  1.38  (0.83-1.09)  H  01/12/19  16:30    














Assessment/Plan


CYSTOSCOPy,ureteric stent placement


 Rt ureteric stone ,fever,UTI


Leucocytosis


Pancreatic mass


HTN


PLAN


 continue iv antibiotics


awaiting PICC line


ID f/u


Awaiting for records from [facility] regarding pancreatic mass biopsy report


As per GI and Oncology w/u for Pacreatic mass as OUTPATIENT

## 2019-01-23 NOTE — PN
Progress Note, Physician





- Current Medication List


Current Medications: 


Active Medications





Acetaminophen (Tylenol -)  650 mg PO Q6H PRN


   PRN Reason: FEVER


   Last Admin: 01/16/19 14:55 Dose:  650 mg


Vancomycin HCl (Vancomycin (Pre-Docked))  1,000 mg in 250 mls @ 166.667 mls/hr 

IVPB Q12H [facility]; Protocol


   Last Admin: 01/23/19 04:22 Dose:  166.667 mls/hr


Polyethylene Glycol (Miralax (For Daily Use) -)  17 gm PO DAILY PRN


   PRN Reason: CONSTIPATION


   Last Admin: 01/21/19 10:19 Dose:  17 grams











- Objective


Vital Signs: 


 Vital Signs











Temperature  98.2 F   01/23/19 09:22


 


Pulse Rate  66   01/23/19 09:22


 


Respiratory Rate  19   01/23/19 09:22


 


Blood Pressure  141/83   01/23/19 09:22


 


O2 Sat by Pulse Oximetry (%)  100   01/22/19 21:00











Labs: 


 CBC, BMP





 01/22/19 06:45 





 01/22/19 06:45 





 INR, PTT











INR  1.38  (0.83-1.09)  H  01/12/19  16:30

## 2019-01-24 VITALS — DIASTOLIC BLOOD PRESSURE: 78 MMHG | HEART RATE: 73 BPM | SYSTOLIC BLOOD PRESSURE: 107 MMHG

## 2019-01-24 VITALS — TEMPERATURE: 97.9 F

## 2019-01-24 LAB
ALBUMIN SERPL-MCNC: 3.1 G/DL (ref 3.4–5)
ALP SERPL-CCNC: 86 U/L (ref 45–117)
ALT SERPL-CCNC: 28 U/L (ref 13–61)
ANION GAP SERPL CALC-SCNC: 6 MMOL/L (ref 8–16)
AST SERPL-CCNC: 17 U/L (ref 15–37)
BASOPHILS # BLD: 0.5 % (ref 0–2)
BILIRUB SERPL-MCNC: 0.6 MG/DL (ref 0.2–1)
BUN SERPL-MCNC: 15 MG/DL (ref 7–18)
CALCIUM SERPL-MCNC: 8.4 MG/DL (ref 8.5–10.1)
CHLORIDE SERPL-SCNC: 105 MMOL/L (ref 98–107)
CO2 SERPL-SCNC: 26 MMOL/L (ref 21–32)
CREAT SERPL-MCNC: 1.2 MG/DL (ref 0.55–1.3)
DEPRECATED RDW RBC AUTO: 16.7 % (ref 11.9–15.9)
EOSINOPHIL # BLD: 2.2 % (ref 0–4.5)
GLUCOSE SERPL-MCNC: 112 MG/DL (ref 74–106)
HCT VFR BLD CALC: 37.1 % (ref 35.4–49)
HGB BLD-MCNC: 12.5 GM/DL (ref 11.7–16.9)
LYMPHOCYTES # BLD: 14.2 % (ref 8–40)
MCH RBC QN AUTO: 28.7 PG (ref 25.7–33.7)
MCHC RBC AUTO-ENTMCNC: 33.8 G/DL (ref 32–35.9)
MCV RBC: 84.8 FL (ref 80–96)
MONOCYTES # BLD AUTO: 6 % (ref 3.8–10.2)
NEUTROPHILS # BLD: 77.1 % (ref 42.8–82.8)
PLATELET # BLD AUTO: 333 K/MM3 (ref 134–434)
PMV BLD: 8.2 FL (ref 7.5–11.1)
POTASSIUM SERPLBLD-SCNC: 4.6 MMOL/L (ref 3.5–5.1)
PROT SERPL-MCNC: 6.6 G/DL (ref 6.4–8.2)
RBC # BLD AUTO: 4.38 M/MM3 (ref 4–5.6)
SODIUM SERPL-SCNC: 137 MMOL/L (ref 136–145)
WBC # BLD AUTO: 15.5 K/MM3 (ref 4–10)

## 2019-01-24 PROCEDURE — 05H933Z INSERTION OF INFUSION DEVICE INTO RIGHT BRACHIAL VEIN, PERCUTANEOUS APPROACH: ICD-10-PCS | Performed by: RADIOLOGY

## 2019-01-24 PROCEDURE — B51MZZA FLUOROSCOPY OF RIGHT UPPER EXTREMITY VEINS, GUIDANCE: ICD-10-PCS | Performed by: RADIOLOGY

## 2019-01-24 PROCEDURE — B54MZZA ULTRASONOGRAPHY OF RIGHT UPPER EXTREMITY VEINS, GUIDANCE: ICD-10-PCS | Performed by: RADIOLOGY

## 2019-01-24 RX ADMIN — LOPERAMIDE HYDROCHLORIDE PRN MG: 2 CAPSULE ORAL at 00:57

## 2019-01-24 RX ADMIN — VANCOMYCIN HYDROCHLORIDE SCH MLS/HR: 1 INJECTION, POWDER, LYOPHILIZED, FOR SOLUTION INTRAVENOUS at 05:29

## 2019-01-24 RX ADMIN — LOPERAMIDE HYDROCHLORIDE PRN MG: 2 CAPSULE ORAL at 10:52

## 2019-01-24 NOTE — PN
Progress Note, Physician


Chief Complaint: 





pt resting comfortably


afebrile,


GI and ID note appreciated


GI rec Out patient W/U for Pancreatic mass


pt is on IV antibiotics


oncology nte appreciated


blood cul on 1/12/19 shows staph lugdunesis


as per Id pt needs 9 more days of  IV antibiotics


d/c home on Vancomycin


d/c home after PICC line





- Current Medication List


Current Medications: 


Active Medications





Acetaminophen (Tylenol -)  650 mg PO Q6H PRN


   PRN Reason: FEVER


   Last Admin: 01/23/19 22:30 Dose:  650 mg


Vancomycin HCl (Vancomycin (Pre-Docked))  1,000 mg in 250 mls @ 166.667 mls/hr 

IVPB Q12H [facility]; Protocol


   Last Admin: 01/24/19 05:29 Dose:  166.667 mls/hr


Loperamide HCl (Imodium -)  4 mg PO Q6H PRN


   PRN Reason: DIARRHEA


   Last Admin: 01/24/19 00:57 Dose:  4 mg











- Objective


Vital Signs: 


 Vital Signs











Temperature  98.3 F   01/24/19 06:00


 


Pulse Rate  63   01/24/19 06:00


 


Respiratory Rate  20   01/24/19 06:00


 


Blood Pressure  132/79   01/24/19 06:00


 


O2 Sat by Pulse Oximetry (%)  99   01/23/19 21:00











Constitutional: Yes: No Distress


Eyes: Yes: Conjunctiva Clear


HENT: Yes: Atraumatic


Neck: Yes: Supple


Cardiovascular: Yes: Regular Rate and Rhythm


Respiratory: Yes: Regular, CTA Bilaterally


Gastrointestinal: Yes: Normal Bowel Sounds


Musculoskeletal: Yes: WNL


Extremities: Yes: WNL


Edema: No


Peripheral Pulses WNL: Yes


Neurological: Yes: WNL


...Motor Strength: WNL


Psychiatric: Yes: WNL


Labs: 


 CBC, BMP





 01/24/19 06:00 





 01/24/19 06:00 





 INR, PTT











INR  1.38  (0.83-1.09)  H  01/12/19  16:30    














Assessment/Plan


CYSTOSCOPy,ureteric stent placement


 Rt ureteric stone ,fever,UTI


Leucocytosis


Pancreatic mass


HTN


PLAN


 continue iv antibiotics Vancomycin igm IV BID for 9 days


awaiting PICC line


ID f/u


Awaiting for records from [facility] regarding pancreatic mass biopsy report


As per GI and Oncology w/u for Pacreatic mass as OUTPATIENT


d/c home after PICC line


F/u with PMD in ![facility]

## 2019-01-24 NOTE — PN
Progress Note, Physician





- Current Medication List


Current Medications: 


Active Medications





Acetaminophen (Tylenol -)  650 mg PO Q6H PRN


   PRN Reason: FEVER


   Last Admin: 01/23/19 22:30 Dose:  650 mg


Vancomycin HCl (Vancomycin (Pre-Docked))  1,000 mg in 250 mls @ 166.667 mls/hr 

IVPB Q12H [facility]; Protocol


   Last Admin: 01/24/19 05:29 Dose:  166.667 mls/hr


Loperamide HCl (Imodium -)  4 mg PO Q6H PRN


   PRN Reason: DIARRHEA


   Last Admin: 01/24/19 10:52 Dose:  4 mg











- Objective


Vital Signs: 


 Vital Signs











Temperature  97.9 F   01/24/19 10:00


 


Pulse Rate  80   01/24/19 10:00


 


Respiratory Rate  17   01/24/19 10:00


 


Blood Pressure  126/73   01/24/19 10:00


 


O2 Sat by Pulse Oximetry (%)  99   01/24/19 10:00











Labs: 


 CBC, BMP





 01/24/19 06:00 





 01/24/19 06:00 





 INR, PTT











INR  1.38  (0.83-1.09)  H  01/12/19  16:30

## 2019-01-25 ENCOUNTER — HOSPITAL ENCOUNTER (OUTPATIENT)
Dept: HOSPITAL 74 - JINFUSION | Age: 69
Discharge: HOME | End: 2019-01-25
Attending: INTERNAL MEDICINE
Payer: COMMERCIAL

## 2019-01-25 VITALS — DIASTOLIC BLOOD PRESSURE: 58 MMHG | HEART RATE: 62 BPM | SYSTOLIC BLOOD PRESSURE: 118 MMHG

## 2019-01-25 VITALS — TEMPERATURE: 98.5 F

## 2019-01-25 DIAGNOSIS — B95.7: ICD-10-CM

## 2019-01-25 DIAGNOSIS — R78.81: ICD-10-CM

## 2019-01-25 DIAGNOSIS — N39.0: Primary | ICD-10-CM

## 2019-01-25 LAB
ANION GAP SERPL CALC-SCNC: 9 MMOL/L (ref 8–16)
BUN SERPL-MCNC: 17 MG/DL (ref 7–18)
CALCIUM SERPL-MCNC: 8.9 MG/DL (ref 8.5–10.1)
CHLORIDE SERPL-SCNC: 107 MMOL/L (ref 98–107)
CO2 SERPL-SCNC: 24 MMOL/L (ref 21–32)
CREAT SERPL-MCNC: 1.5 MG/DL (ref 0.55–1.3)
DEPRECATED RDW RBC AUTO: 16.6 % (ref 11.9–15.9)
GLUCOSE SERPL-MCNC: 148 MG/DL (ref 74–106)
HCT VFR BLD CALC: 38 % (ref 35.4–49)
HGB BLD-MCNC: 13.2 GM/DL (ref 11.7–16.9)
MCH RBC QN AUTO: 29.6 PG (ref 25.7–33.7)
MCHC RBC AUTO-ENTMCNC: 34.7 G/DL (ref 32–35.9)
MCV RBC: 85.3 FL (ref 80–96)
PLATELET # BLD AUTO: 352 K/MM3 (ref 134–434)
PMV BLD: 8.2 FL (ref 7.5–11.1)
POTASSIUM SERPLBLD-SCNC: 4.3 MMOL/L (ref 3.5–5.1)
RBC # BLD AUTO: 4.46 M/MM3 (ref 4–5.6)
SODIUM SERPL-SCNC: 140 MMOL/L (ref 136–145)
WBC # BLD AUTO: 12.6 K/MM3 (ref 4–10)

## 2019-01-25 PROCEDURE — G0480 DRUG TEST DEF 1-7 CLASSES: HCPCS

## 2019-01-26 ENCOUNTER — HOSPITAL ENCOUNTER (OUTPATIENT)
Dept: HOSPITAL 74 - JINFUSION | Age: 69
Discharge: HOME | End: 2019-01-26
Attending: INTERNAL MEDICINE
Payer: COMMERCIAL

## 2019-01-26 VITALS — SYSTOLIC BLOOD PRESSURE: 115 MMHG | HEART RATE: 69 BPM | DIASTOLIC BLOOD PRESSURE: 72 MMHG

## 2019-01-26 VITALS — TEMPERATURE: 98.8 F

## 2019-01-26 DIAGNOSIS — N39.0: Primary | ICD-10-CM

## 2019-01-26 DIAGNOSIS — B95.7: ICD-10-CM

## 2019-01-26 DIAGNOSIS — R78.81: ICD-10-CM

## 2019-01-26 LAB
ANION GAP SERPL CALC-SCNC: 5 MMOL/L (ref 8–16)
BASOPHILS # BLD: 0.9 % (ref 0–2)
BUN SERPL-MCNC: 13 MG/DL (ref 7–18)
CALCIUM SERPL-MCNC: 8.8 MG/DL (ref 8.5–10.1)
CHLORIDE SERPL-SCNC: 105 MMOL/L (ref 98–107)
CO2 SERPL-SCNC: 28 MMOL/L (ref 21–32)
CREAT SERPL-MCNC: 1.4 MG/DL (ref 0.55–1.3)
DEPRECATED RDW RBC AUTO: 16.6 % (ref 11.9–15.9)
EOSINOPHIL # BLD: 1.9 % (ref 0–4.5)
GLUCOSE SERPL-MCNC: 108 MG/DL (ref 74–106)
HCT VFR BLD CALC: 36.4 % (ref 35.4–49)
HGB BLD-MCNC: 12.4 GM/DL (ref 11.7–16.9)
LYMPHOCYTES # BLD: 19.3 % (ref 8–40)
MCH RBC QN AUTO: 29.3 PG (ref 25.7–33.7)
MCHC RBC AUTO-ENTMCNC: 34.1 G/DL (ref 32–35.9)
MCV RBC: 85.9 FL (ref 80–96)
MONOCYTES # BLD AUTO: 7.1 % (ref 3.8–10.2)
NEUTROPHILS # BLD: 70.8 % (ref 42.8–82.8)
PLATELET # BLD AUTO: 332 K/MM3 (ref 134–434)
PMV BLD: 8.1 FL (ref 7.5–11.1)
POTASSIUM SERPLBLD-SCNC: 4.9 MMOL/L (ref 3.5–5.1)
RBC # BLD AUTO: 4.24 M/MM3 (ref 4–5.6)
SODIUM SERPL-SCNC: 138 MMOL/L (ref 136–145)
WBC # BLD AUTO: 12.1 K/MM3 (ref 4–10)

## 2019-01-26 PROCEDURE — G0480 DRUG TEST DEF 1-7 CLASSES: HCPCS

## 2019-01-27 ENCOUNTER — HOSPITAL ENCOUNTER (OUTPATIENT)
Dept: HOSPITAL 74 - JINFUSION | Age: 69
Discharge: HOME | End: 2019-01-27
Attending: INTERNAL MEDICINE
Payer: COMMERCIAL

## 2019-01-27 VITALS — HEART RATE: 59 BPM | SYSTOLIC BLOOD PRESSURE: 118 MMHG | DIASTOLIC BLOOD PRESSURE: 77 MMHG

## 2019-01-27 VITALS — TEMPERATURE: 97.8 F

## 2019-01-27 DIAGNOSIS — R78.81: ICD-10-CM

## 2019-01-27 DIAGNOSIS — B95.7: ICD-10-CM

## 2019-01-27 DIAGNOSIS — N39.0: Primary | ICD-10-CM

## 2019-01-28 ENCOUNTER — HOSPITAL ENCOUNTER (OUTPATIENT)
Dept: HOSPITAL 74 - JINFUSION | Age: 69
Discharge: HOME | End: 2019-01-28
Attending: INTERNAL MEDICINE
Payer: COMMERCIAL

## 2019-01-28 VITALS — SYSTOLIC BLOOD PRESSURE: 101 MMHG | DIASTOLIC BLOOD PRESSURE: 76 MMHG | HEART RATE: 64 BPM

## 2019-01-28 VITALS — TEMPERATURE: 98.9 F

## 2019-01-28 DIAGNOSIS — B95.7: ICD-10-CM

## 2019-01-28 DIAGNOSIS — N39.0: Primary | ICD-10-CM

## 2019-01-28 DIAGNOSIS — R78.81: ICD-10-CM

## 2019-01-28 LAB
ANION GAP SERPL CALC-SCNC: 6 MMOL/L (ref 8–16)
BUN SERPL-MCNC: 12 MG/DL (ref 7–18)
CALCIUM SERPL-MCNC: 8.7 MG/DL (ref 8.5–10.1)
CHLORIDE SERPL-SCNC: 106 MMOL/L (ref 98–107)
CO2 SERPL-SCNC: 26 MMOL/L (ref 21–32)
CREAT SERPL-MCNC: 1.5 MG/DL (ref 0.55–1.3)
DEPRECATED RDW RBC AUTO: 16.2 % (ref 11.9–15.9)
GLUCOSE SERPL-MCNC: 107 MG/DL (ref 74–106)
HCT VFR BLD CALC: 36 % (ref 35.4–49)
HGB BLD-MCNC: 12.5 GM/DL (ref 11.7–16.9)
MCH RBC QN AUTO: 29.7 PG (ref 25.7–33.7)
MCHC RBC AUTO-ENTMCNC: 34.8 G/DL (ref 32–35.9)
MCV RBC: 85.3 FL (ref 80–96)
PLATELET # BLD AUTO: 322 K/MM3 (ref 134–434)
PMV BLD: 8 FL (ref 7.5–11.1)
POTASSIUM SERPLBLD-SCNC: 4 MMOL/L (ref 3.5–5.1)
RBC # BLD AUTO: 4.22 M/MM3 (ref 4–5.6)
SODIUM SERPL-SCNC: 139 MMOL/L (ref 136–145)
WBC # BLD AUTO: 9.4 K/MM3 (ref 4–10)

## 2019-01-28 PROCEDURE — G0480 DRUG TEST DEF 1-7 CLASSES: HCPCS

## 2019-01-29 ENCOUNTER — HOSPITAL ENCOUNTER (OUTPATIENT)
Dept: HOSPITAL 74 - JINFUSION | Age: 69
Discharge: HOME | End: 2019-01-29
Attending: INTERNAL MEDICINE
Payer: COMMERCIAL

## 2019-01-29 VITALS — SYSTOLIC BLOOD PRESSURE: 127 MMHG | HEART RATE: 78 BPM | DIASTOLIC BLOOD PRESSURE: 74 MMHG

## 2019-01-29 VITALS — TEMPERATURE: 97.9 F

## 2019-01-29 DIAGNOSIS — N39.0: Primary | ICD-10-CM

## 2019-01-29 DIAGNOSIS — B95.7: ICD-10-CM

## 2019-01-29 DIAGNOSIS — R78.81: ICD-10-CM

## 2019-01-30 ENCOUNTER — HOSPITAL ENCOUNTER (OUTPATIENT)
Dept: HOSPITAL 74 - JINFUSION | Age: 69
Discharge: HOME | End: 2019-01-30
Attending: INTERNAL MEDICINE
Payer: COMMERCIAL

## 2019-01-30 VITALS — TEMPERATURE: 97.5 F

## 2019-01-30 VITALS — DIASTOLIC BLOOD PRESSURE: 70 MMHG | SYSTOLIC BLOOD PRESSURE: 102 MMHG | HEART RATE: 69 BPM

## 2019-01-30 DIAGNOSIS — B95.7: ICD-10-CM

## 2019-01-30 DIAGNOSIS — R78.81: ICD-10-CM

## 2019-01-30 DIAGNOSIS — N39.0: Primary | ICD-10-CM

## 2019-01-31 ENCOUNTER — HOSPITAL ENCOUNTER (OUTPATIENT)
Dept: HOSPITAL 74 - JINFUSION | Age: 69
Discharge: HOME | End: 2019-01-31
Attending: INTERNAL MEDICINE
Payer: COMMERCIAL

## 2019-01-31 VITALS — HEART RATE: 72 BPM | DIASTOLIC BLOOD PRESSURE: 69 MMHG | SYSTOLIC BLOOD PRESSURE: 123 MMHG

## 2019-01-31 VITALS — TEMPERATURE: 98.2 F

## 2019-01-31 DIAGNOSIS — B95.7: ICD-10-CM

## 2019-01-31 DIAGNOSIS — N39.0: Primary | ICD-10-CM

## 2019-01-31 DIAGNOSIS — R78.81: ICD-10-CM

## 2019-01-31 LAB
ALBUMIN SERPL-MCNC: 3.4 G/DL (ref 3.4–5)
ALP SERPL-CCNC: 82 U/L (ref 45–117)
ALT SERPL-CCNC: 17 U/L (ref 13–61)
ANION GAP SERPL CALC-SCNC: 9 MMOL/L (ref 8–16)
AST SERPL-CCNC: 13 U/L (ref 15–37)
BILIRUB SERPL-MCNC: 0.5 MG/DL (ref 0.2–1)
BUN SERPL-MCNC: 11 MG/DL (ref 7–18)
CALCIUM SERPL-MCNC: 8.7 MG/DL (ref 8.5–10.1)
CHLORIDE SERPL-SCNC: 109 MMOL/L (ref 98–107)
CO2 SERPL-SCNC: 23 MMOL/L (ref 21–32)
CREAT SERPL-MCNC: 1.3 MG/DL (ref 0.55–1.3)
DEPRECATED RDW RBC AUTO: 16.5 % (ref 11.9–15.9)
GLUCOSE SERPL-MCNC: 117 MG/DL (ref 74–106)
HCT VFR BLD CALC: 37.4 % (ref 35.4–49)
HGB BLD-MCNC: 12.5 GM/DL (ref 11.7–16.9)
MCH RBC QN AUTO: 28.6 PG (ref 25.7–33.7)
MCHC RBC AUTO-ENTMCNC: 33.3 G/DL (ref 32–35.9)
MCV RBC: 85.8 FL (ref 80–96)
PLATELET # BLD AUTO: 302 K/MM3 (ref 134–434)
PMV BLD: 8 FL (ref 7.5–11.1)
POTASSIUM SERPLBLD-SCNC: 3.9 MMOL/L (ref 3.5–5.1)
PROT SERPL-MCNC: 7.4 G/DL (ref 6.4–8.2)
RBC # BLD AUTO: 4.36 M/MM3 (ref 4–5.6)
SODIUM SERPL-SCNC: 141 MMOL/L (ref 136–145)
WBC # BLD AUTO: 13.6 K/MM3 (ref 4–10)

## 2019-01-31 PROCEDURE — G0480 DRUG TEST DEF 1-7 CLASSES: HCPCS

## 2019-02-01 ENCOUNTER — HOSPITAL ENCOUNTER (OUTPATIENT)
Dept: HOSPITAL 74 - JINFUSION | Age: 69
Discharge: HOME | End: 2019-02-01
Attending: INTERNAL MEDICINE
Payer: COMMERCIAL

## 2019-02-01 VITALS — DIASTOLIC BLOOD PRESSURE: 82 MMHG | HEART RATE: 74 BPM | SYSTOLIC BLOOD PRESSURE: 130 MMHG

## 2019-02-01 VITALS — TEMPERATURE: 97.7 F

## 2019-02-01 DIAGNOSIS — B95.7: ICD-10-CM

## 2019-02-01 DIAGNOSIS — R78.81: ICD-10-CM

## 2019-02-01 DIAGNOSIS — N39.0: Primary | ICD-10-CM

## 2019-02-04 ENCOUNTER — HOSPITAL ENCOUNTER (INPATIENT)
Dept: HOSPITAL 74 - JER | Age: 69
LOS: 3 days | Discharge: HOME | DRG: 683 | End: 2019-02-07
Attending: INTERNAL MEDICINE | Admitting: INTERNAL MEDICINE
Payer: COMMERCIAL

## 2019-02-04 VITALS — BODY MASS INDEX: 22.7 KG/M2

## 2019-02-04 DIAGNOSIS — E86.0: ICD-10-CM

## 2019-02-04 DIAGNOSIS — N17.9: Primary | ICD-10-CM

## 2019-02-04 DIAGNOSIS — K52.9: ICD-10-CM

## 2019-02-04 DIAGNOSIS — I10: ICD-10-CM

## 2019-02-04 DIAGNOSIS — N20.1: ICD-10-CM

## 2019-02-04 LAB
ALBUMIN SERPL-MCNC: 3 G/DL (ref 3.4–5)
ALP SERPL-CCNC: 77 U/L (ref 45–117)
ALT SERPL-CCNC: 16 U/L (ref 13–61)
ANION GAP SERPL CALC-SCNC: 8 MMOL/L (ref 8–16)
APPEARANCE UR: (no result)
AST SERPL-CCNC: 15 U/L (ref 15–37)
BACTERIA #/AREA URNS HPF: (no result) /HPF
BASOPHILS # BLD: 0.8 % (ref 0–2)
BILIRUB SERPL-MCNC: 0.4 MG/DL (ref 0.2–1)
BILIRUB UR STRIP.AUTO-MCNC: NEGATIVE MG/DL
BUN SERPL-MCNC: 19 MG/DL (ref 7–18)
CALCIUM SERPL-MCNC: 8.4 MG/DL (ref 8.5–10.1)
CHLORIDE SERPL-SCNC: 107 MMOL/L (ref 98–107)
CO2 SERPL-SCNC: 24 MMOL/L (ref 21–32)
COLOR UR: (no result)
CREAT SERPL-MCNC: 1.7 MG/DL (ref 0.55–1.3)
DEPRECATED RDW RBC AUTO: 15.5 % (ref 11.9–15.9)
EOSINOPHIL # BLD: 2.6 % (ref 0–4.5)
EPITH CASTS URNS QL MICRO: (no result) /HPF
GLUCOSE SERPL-MCNC: 89 MG/DL (ref 74–106)
HCT VFR BLD CALC: 37.6 % (ref 35.4–49)
HGB BLD-MCNC: 13 GM/DL (ref 11.7–16.9)
KETONES UR QL STRIP: (no result)
LEUKOCYTE ESTERASE UR QL STRIP.AUTO: (no result)
LIPASE SERPL-CCNC: 62 U/L (ref 73–393)
LYMPHOCYTES # BLD: 18.4 % (ref 8–40)
MCH RBC QN AUTO: 29.6 PG (ref 25.7–33.7)
MCHC RBC AUTO-ENTMCNC: 34.5 G/DL (ref 32–35.9)
MCV RBC: 86 FL (ref 80–96)
MONOCYTES # BLD AUTO: 11.1 % (ref 3.8–10.2)
MUCOUS THREADS URNS QL MICRO: (no result)
NEUTROPHILS # BLD: 67.1 % (ref 42.8–82.8)
NITRITE UR QL STRIP: NEGATIVE
PH UR: 5 [PH] (ref 5–8)
PLATELET # BLD AUTO: 224 K/MM3 (ref 134–434)
PMV BLD: 8.3 FL (ref 7.5–11.1)
POTASSIUM SERPLBLD-SCNC: 4.2 MMOL/L (ref 3.5–5.1)
PROT SERPL-MCNC: 6.9 G/DL (ref 6.4–8.2)
PROT UR QL STRIP: (no result)
PROT UR QL STRIP: NEGATIVE
RBC # BLD AUTO: 4.37 M/MM3 (ref 4–5.6)
SODIUM SERPL-SCNC: 139 MMOL/L (ref 136–145)
SP GR UR: 1.02 (ref 1.01–1.03)
UROBILINOGEN UR STRIP-MCNC: NEGATIVE MG/DL (ref 0.2–1)
WBC # BLD AUTO: 9.2 K/MM3 (ref 4–10)

## 2019-02-04 NOTE — PDOC
Attending Attestation





- Resident


Resident Name: Horan,Sarah





- ED Attending Attestation


I have performed the following: I have examined & evaluated the patient, The 

case was reviewed & discussed with the resident, I agree w/resident's findings 

& plan





- HPI


HPI: 





02/04/19 11:14





73 YOM with h/o recent admission 1/12/19-1/24/19 for nephrolithiasis w/

superimposed pyelonephritis 2/2 to obstructing stone just finished PICC line 

course of vancomycin 3 days ago, HTN, remote h/o CVA (20+ years previous w/no 

residual deficits), pancreatic mass (s/p biopsy [facility], no known malignancy), who 

p/w exacerbated right flank and RLQ>LLQ abdominal pain and decreased urination a

/w suprapubic pressure, with small amounts of blood.


Blood cultures at that time grew Staph lugdunesis s/p IV vancomycin via PICC, 

that was recently completed.














- Physicial Exam


PE: 





02/04/19 11:14





NAD, well appearing, PERRL, EOMI, MMM, nl conjunctiva, anicteric; neck supple. 

lungs clear, +tachy, abdomen soft +right sided and groin tenderness, no rebound 

or guarding, mild CVAT. RAMIREZ x4, no focal neuro deficits. No peripheral edema. 

normal color for ethnicity, WWP.











- Medical Decision Making





02/04/19 11:15





See HPI for details


Vital signs reviewed, afebrile, +tachy.





DDx. renal colic, ureterolithiasis, hydronephrosis, ureteral stent complication

, stent obstruction, UTI, pyelonephritis, pancreatitis, acute colitis, intra 

abdominal infection/inflammation..





Prior notes reviewed, including admissions, discharges and consultations. 

laboratory results and imaging reviewed, basic labs and lytes wnl, notable for 

mild elevation in Cr to 1.7, higher than prior. lytes, LFTs and lipase normal 


UA_declined straight cath, low bladder volume likely from dehydration. check 

for infection.


EKG normal sinus rhythm, no interval abnormalities, narrow QRS, ST and T wave 

segments and morphology normal. 


ED course: analgesia, fluids


bedside renal sono with no hydronephrosis, large prostate and bladder volume of 

40ml. 


clinically dehydrated, with minimal urine output, as see on US. given IVF 

support.





CT brooks given stent for ureterolithiasis, r/o superinfected stone vs worsening 

obstruction vs infection.


urology cs with Dr Jeronimo Mcnulty - see as inpatient given recent ureteral stenting 

procedure that appears patent and functional.





CT a/p with diffuse colitis, no hydro, ureteral stent in place on right. 


IV cef/flagyl for colitis, continued fluids and tx.


no diarrhea, doubt C diff, no stooling abnormalities. 





dispo: admit for continued medical management, of acute colitis, IV abx. uro cs

, pain control.








02/04/19 17:28








Procedures





- Bedside Ultrasound


Other: Renal


Remarks: 





02/04/19 11:16





POCUS renal exam performed, indication includes abdominal/flank pain. views 

obtained: bilateral kidneys in short and long axis, bladder. findings: no 

evidence of hydronephrosis. large prostate. bladder volume 40ml. Impression: 

low bladder volume, no hydronephrosis.

## 2019-02-04 NOTE — PDOC
History of Present Illness





- General


Chief Complaint: Pain


Stated Complaint: ABD PAIN


Time Seen by Provider: 02/04/19 09:57


History Source: Patient


Exam Limitations: No Limitations





- History of Present Illness


Initial Comments: 





02/04/19 09:58


68YOM who was admitted 1/12/19-1/24/19 for nephrolithiasis w/superimposed 

pyelonephritis 2/2 to obstructing stone just finished PICC line course of 

vancomycin 3 days ago, also with h/o HTN, remote h/o CVA (20+ years previous w/

no residual deficits), pancreatic mass (s/p biopsy [facility], no known malignancy), 

who p/w exacerbated right flank and RLQ>LLQ abdominal pain which is sharp, 10/10

, otherwise non-radiating, constant and not exacerbated or alleviated by any 

action he takes. Has taken no medications for this today. He additionally notes 

hematuria increased this morning, ability to void only small amounts at a time, 

nausea, and headache. Denies f/c, vomiting, diarrhea, bloody/black stool





Past History





- Past Medical History


Allergies/Adverse Reactions: 


 Allergies











Allergy/AdvReac Type Severity Reaction Status Date / Time


 


No Known Allergies Allergy   Verified 02/08/19 17:29











Home Medications: 


Ambulatory Orders





Amlodipine Besylate [Norvasc -] 10 mg PO DAILY 01/10/19 


Diclofenac Potassium 50 mg PO ASDIR 01/10/19 


Fluticasone Furoate [Children's Flonase Sensimist] 5.9 ml NS ASDIR 01/10/19 


Loratadine 10 mg PO ASDIR 01/10/19 


Omeprazole 20 mg PO ASDIR 01/10/19 


Oxybutynin Chloride [Ditropan -] 5 mg PO DAILY 01/10/19 


Tamsulosin HCl [Flomax] 0.4 mg PO DAILY #7 cap.er.24h 01/10/19 


Levofloxacin [Levaquin] 500 mg PO DAILY 7 Days #7 tablet 02/07/19 


Oxycodone HCl/Acetaminophen [Percocet 5-325 mg Tablet] 1 tab PO Q4H PRN 02/08/ 19 








CVA: Yes


COPD: No


GI Disorders: Yes (renaql stones w stent)


HTN: Yes





- Immunization History


Immunization Up to Date: Yes





- Suicide/Smoking/Psychosocial Hx


Smoking History: Unknown if ever smoked


Have you smoked in the past 12 months: No


Number of Cigarettes Smoked Daily: 10


'Breaking Loose' booklet given: 09/13/15


Hx Alcohol Use: No


Drug/Substance Use Hx: No





**Review of Systems





- Review of Systems


Able to Perform ROS?: Yes


Comments:: 


GEN: no fever, chills, malaise, generalized weakness, or weight change


HEENT: no ear pain, sore throat, vision change, or eye pain


CV: no chest pain, palpitations, lightheadedness, syncope, or edema


RESP: no cough, wheezing, or SOB


GI: abdominal pain, nausea, no vomiting, diarrhea, constipation, or white/black/

bloody stool


: dysuria, hematuria, voiding small amounts, no incontinence, or discharge 


MSK: no neck/back pain, muscle weakness/pain, or joint swelling/pain


NEURO: mild headache, no seizure, vertigo, numbness, tingling, or focal weakness


PSYCH: no substance use, no behavior change


SKIN: no jaundice, no rash


ROS otherwise negative except as noted in HPI





*Physical Exam





- Vital Signs


 Last Vital Signs











Temp Pulse Resp BP Pulse Ox


 


 98.8 F   100 H  20   138/82   100 


 


 02/04/19 09:45  02/04/19 09:45  02/04/19 09:45  02/04/19 09:45  02/04/19 09:45














- Physical Exam


Comments: 





02/04/19 11:38


GENERAL: uncomfortable-appearing, A/Ox4, mild distress, answers questions 

appropriately


HEENT: PERRLA, EOMI, moist mucous membranes


NECK/BACK: no midline ttp, no spinal stepoff or deformity, no hematoma, full ROM

, neck supple


CARDIOVASCULAR: regular rate/rhythm, normal S1S2, no MGR, strong peripheral 

pulses, capillary refill <2 seconds, extremities wwp, no edema


LUNGS/RESPIRATORY: no respiratory distress, CTAB


GI/ABDOMEN: symmetric side-to-side, normoactive BS, soft, RLQ>LLQ ttp, no 

midline pulsatile masses


: right inferior CVA tenderness


EXTREMITIES: no muscle atrophy, no acute deformity


SKIN: warm and dry, no pallor, no jaundice, no rash, no bruising, no skin 

breakdown, no cuts, no lesions


NEUROLOGICAL: GCS 15, CN II-XII grossly intact, 5/5 strength proximally and 

distally, no facial droop





Moderate Sedation





- Procedure Monitoring


Vital Signs: 


Procedure Monitoring Vital Signs











Temperature  98.8 F   02/04/19 09:45


 


Pulse Rate  100 H  02/04/19 09:45


 


Respiratory Rate  20   02/04/19 09:45


 


Blood Pressure  138/82   02/04/19 09:45


 


O2 Sat by Pulse Oximetry (%)  100   02/04/19 09:45











**Heart Score/ECG Review


  ** #1


Sinus rhythm, rate 87, normal axis and intervals, no ST-T changes





ED Treatment Course





- LABORATORY


CBC & Chemistry Diagram: 


 02/07/19 07:00





 02/07/19 07:00





Medical Decision Making





- Medical Decision Making





02/04/19 11:40


Pt p/w severe flank pain.





 Initial Vital Signs











Temp Pulse Resp BP Pulse Ox


 


 98.8 F   100 H  20   138/82   100 


 


 02/04/19 09:45  02/04/19 09:45  02/04/19 09:45  02/04/19 09:45  02/04/19 09:45








Exam: As noted in Physical Exam section.


DDX IBNLT: most likely normal course of ureteral stent placement on same side 

as pain, less likely continued renal colic, obstructive uropathy, UTI/

pyelonephritis, rental artery aneurysm or dissection (sherly w/ hematuria and no 

stone visualized on imaging), or any other more serious etiology i.e. AAA/AD, 

pancreatitis, gastritis, PUD, colitis, ruptured diverticulosis, diverticulitis 

wwo abscess or perforation, stump appendicitis, hernia, SBO, malignancy, 

splenic infarction, mesenteric ischemia, bowel perforation, testicular torsion, 

epididymitis, orchitis, urethritis, musculoskeletal, constipation, etc.


W/U ordered: Labs as noted below, Spiral CT


TX ordered: IVF, Ofirmev, Morphine





POCUS bladder shows only ~40cc urine.





 Laboratory Tests











  02/04/19 02/04/19





  12:57 12:57


 


WBC  9.2 


 


RBC  4.37 


 


Hgb  13.0 


 


Hct  37.6 


 


MCV  86.0 


 


MCH  29.6 


 


MCHC  34.5 


 


RDW  15.5 


 


Plt Count  224  D 


 


MPV  8.3 


 


Absolute Neuts (auto)  6.2 


 


Neutrophils %  67.1 


 


Lymphocytes %  18.4 


 


Monocytes %  11.1 H 


 


Eosinophils %  2.6 


 


Basophils %  0.8 


 


Nucleated RBC %  0 


 


Sodium   139


 


Potassium   4.2


 


Chloride   107


 


Carbon Dioxide   24


 


Anion Gap   8


 


BUN   19 H


 


Creatinine   1.7 H


 


Creat Clearance w eGFR   40.28


 


Random Glucose   89


 


Calcium   8.4 L


 


Total Bilirubin   0.4


 


AST   15


 


ALT   16


 


Alkaline Phosphatase   77


 


Total Protein   6.9


 


Albumin   3.0 L


 


Lipase   62 L








Reassessment: Patient states much more comfortable after morphine.





02/04/19 14:28


Patient has Cr of 1.7 (up from last several measurements here) and diffuse 

colitis on CT.


I have ordered ceftriaxone/flagyl IVPB.


The Pts symptoms persist despite ED treatments.


The Pt is unsafe for discharge at this time.


They require further hospital observation, workup, and treatment.


Page has been sent to Dr. Soco Lyons for admission.


Blank Decision to Admit order is placed per ED protocol.





02/04/19 15:39


I spoke with Dr. Soco Lyons and in agreement patient to go to IP Med/Surg.


Decision to Admit order corrected with Dr. Lyons's name.





*DC/Admit/Observation/Transfer


Diagnosis at time of Disposition: 


 Colitis, HERNANDEZ (acute kidney injury), Dehydration








- Discharge Dispostion


Disposition: HOME


Condition at time of disposition: Guarded


Decision to Admit order: Yes





- Prescriptions





- Referrals





- Patient Instructions





- Post Discharge Activity

## 2019-02-04 NOTE — EKG
Test Reason : 

Blood Pressure : ***/*** mmHG

Vent. Rate : 087 BPM     Atrial Rate : 087 BPM

   P-R Int : 164 ms          QRS Dur : 076 ms

    QT Int : 354 ms       P-R-T Axes : [phone] degrees

   QTc Int : 425 ms

 

NORMAL SINUS RHYTHM

NORMAL ECG

WHEN COMPARED WITH ECG OF 12-JAN-2019 16:28,

VENT. RATE HAS DECREASED BY  45 BPM

Confirmed by RAVEN MAZA MD ([id number]) on 2/4/2019 5:30:39 PM

 

Referred By:             Confirmed By:RAVEN MAZA MD

## 2019-02-05 RX ADMIN — POTASSIUM CHLORIDE, DEXTROSE MONOHYDRATE AND SODIUM CHLORIDE SCH MLS/HR: 75; 5; 450 INJECTION, SOLUTION INTRAVENOUS at 23:23

## 2019-02-05 RX ADMIN — KETOROLAC TROMETHAMINE SCH MG: 30 INJECTION INTRAMUSCULAR; INTRAVENOUS at 02:51

## 2019-02-05 RX ADMIN — KETOROLAC TROMETHAMINE SCH: 30 INJECTION INTRAMUSCULAR; INTRAVENOUS at 17:10

## 2019-02-05 RX ADMIN — Medication PRN MG: at 23:38

## 2019-02-05 RX ADMIN — POTASSIUM CHLORIDE, DEXTROSE MONOHYDRATE AND SODIUM CHLORIDE SCH MLS/HR: 75; 5; 450 INJECTION, SOLUTION INTRAVENOUS at 10:36

## 2019-02-05 RX ADMIN — KETOROLAC TROMETHAMINE SCH MG: 30 INJECTION INTRAMUSCULAR; INTRAVENOUS at 09:15

## 2019-02-05 RX ADMIN — AMPICILLIN SODIUM AND SULBACTAM SODIUM SCH MLS/HR: 1; .5 INJECTION, POWDER, FOR SOLUTION INTRAMUSCULAR; INTRAVENOUS at 17:10

## 2019-02-05 RX ADMIN — AMPICILLIN SODIUM AND SULBACTAM SODIUM SCH MLS/HR: 1; .5 INJECTION, POWDER, FOR SOLUTION INTRAMUSCULAR; INTRAVENOUS at 12:19

## 2019-02-05 NOTE — HP
DATE OF ADMISSION:  02/04/2019

 

DATE OF DICTATION:  02/05/2019

 

HISTORY OF PRESENT ILLNESS:  This is a 68-year-old male known to have bilateral

kidney stones, recently had a stent placed in the right ureter, who came to the

office 2 days ago with abdominal pain.  Pain medications were given, no relief, so he

came back to the emergency room with complaints of lower abdominal pain mostly on the

right side.  He has no appetite, not eating.  In the ER, patient was noted to have

dehydration, so, he got admitted with a diagnosis of dehydration, abdominal pain,

kidney stones.  Patient also diagnosed to have pancreatic mass with ductal

dilatation.  Biopsy in the past was negative.  Known to Oncology Department.  This

morning, patient says his pain is a little better, but still no appetite, not eating.

 

 

PHYSICAL EXAMINATION:

Vital signs:  /60, respirations 20, temperature 98, pulse 84.  

HEENT:  Unremarkable.  

Neck:  Supple.  No JVD.  

Lungs:  Clear.  

Heart:  S1, S2 normal.  No S3, S4.  

Abdomen:  Has generalized guarding mostly on the right lower quadrant area.  

Rectal:  Examination not done.  

Extremities:  Legs no edema.  

 

LABORATORY REPORTS:  WBC 9.2, hemoglobin 13, platelets 224.  Chemistry:  Sodium 139,

potassium 4.2, chloride 107, BUN 19, creatinine 1.7, lipase 62.  CT of the abdomen

showed diffuse wall thickening of the colon with pericolic inflammation consistent

with diffuse colitis, ureteric stones on the right side with a stent.  

 

IMPRESSION:  Colitis, kidney stones, dehydration.  

 

PLAN:  IV fluids, IV antibiotics, oncologic consult Dr. Kline.  Will follow.  

 

 

TEOFILO [id number]

DD: 02/05/2019 09:27

DT: 02/05/2019 10:44

Job #:  [id number]

## 2019-02-05 NOTE — CONSULT
Consult - text type





- Consultation


Consultation Note: 





CC: abdominal pain





hpi:  Patient with historyof obstructing right ureteral stone s/p stent.  

Patient now presents with abdominal pain with diarrhea.  Patient denies right 

flank pain, fever, chills, dysuria, or gross hematuria.





PE


vss; afeb





abd-soft, nontender; no CVAT bilaterally


genitalia-nl phallus and testes


rectal-3+ prostate with asymmetry





renal sono-no evidence of right hydro





imp


diarrhea


abdominal pain


s/p right stent for right renal stone





plan


supportive care


will schedule right eswl as outpatient








discussed with patient and wife x 20 minutes

## 2019-02-06 RX ADMIN — POTASSIUM CHLORIDE, DEXTROSE MONOHYDRATE AND SODIUM CHLORIDE SCH MLS/HR: 75; 5; 450 INJECTION, SOLUTION INTRAVENOUS at 14:33

## 2019-02-06 RX ADMIN — KETOROLAC TROMETHAMINE SCH: 30 INJECTION INTRAMUSCULAR; INTRAVENOUS at 17:21

## 2019-02-06 RX ADMIN — Medication PRN MG: at 21:40

## 2019-02-06 RX ADMIN — KETOROLAC TROMETHAMINE SCH: 30 INJECTION INTRAMUSCULAR; INTRAVENOUS at 09:59

## 2019-02-06 RX ADMIN — AMPICILLIN SODIUM AND SULBACTAM SODIUM SCH MLS/HR: 1; .5 INJECTION, POWDER, FOR SOLUTION INTRAMUSCULAR; INTRAVENOUS at 01:37

## 2019-02-06 RX ADMIN — KETOROLAC TROMETHAMINE SCH: 30 INJECTION INTRAMUSCULAR; INTRAVENOUS at 01:36

## 2019-02-06 NOTE — PN
Progress Note (short form)





- Note


Progress Note: 





Patient  seen and examined


Recently hospitilized for urosepsis with kidney stones


Has pancreatic distal  body or tail 4.4 cm mass with ductal dilatation and 

distal pancreatic atrophy





PMH: HBP, stroke, kidney stones, urosepsis, kidney cysts 





Past surgery - appendectomy;  spinal surgery x2,  prostate surgery,Stenting of 

kidney  





ROS


no headaches, diplopia, epistaxis, dysphagia, chest pain, SOB, abdominal and 

suprapubic pains, dysuria, back pains, hiccups


 Current Medications











Generic Name Dose Route Start Last Admin





  Trade Name Freq  PRN Reason Stop Dose Admin


 


Potassium Chloride/Dextrose/Sod Cl  10 meq in 1,000 mls @ 83 mls/hr  02/05/19 09

:30  02/06/19 14:33





  D5-1/2ns+10 Meq Kcl -  IV   83 mls/hr





  ASDIR NAVYA   Administration





     





     





     





     


 


Levofloxacin  500 mg in 100 mls @ 100 mls/hr  02/06/19 10:00  02/06/19 09:56





  Levaquin 500 Mg Premixed Ivpb -  IVPB   100 mls/hr





  DAILY NAVYA   Administration





     





     





     





     


 


Ketorolac Tromethamine  30 mg  02/05/19 02:30  02/06/19 17:21





  Toradol Injection -  IVPUSH  02/10/19 02:29  Not Given





  Q8H-IV NAVYA   





     





     





     





     


 


Melatonin  5 mg  02/05/19 23:29  02/05/19 23:38





  Melatonin  PO   5 mg





  HS PRN   Administration





  INSOMNIA   





     





     





     


 


Oxycodone HCl  5 mg  02/05/19 09:32  02/06/19 14:25





  Roxicodone -  PO   5 mg





  Q4H PRN   Administration





  PAIN LEVEL 4 - 6   





     





     





     


 


Tamsulosin HCl  0.8 mg  02/07/19 08:30  





  Flomax -  PO   





  DAILY@0830 NAVYA   





     





     





     





     








 Last Vital Signs











Temp Pulse Resp BP Pulse Ox


 


 99.2 F   77   18   125/70   98 


 


 02/06/19 17:34  02/06/19 17:34  02/06/19 17:34  02/06/19 17:34  02/04/19 18:39











HEENT: JO, EOM Intact


Oropharynx: No thrush, No mucositis


Neck: Supple


Nodes: Without adenopathy


Cor: RSR, No murmurs, No gallops


Lungs: diminished breath sounds 


Abd: Soft, Normal bowel sounds, No organomegaly


Ext:No significant edema


Skin: No rashes, Integument intact


testes descended, circumcised


 CBC, BMP





 02/04/19 12:57 





 02/04/19 12:57 











Impression:





Urosepsis/UTI


KIdney stones/


Kidney stent


CKD


Hiccups





4.4 cm distal pancreatic body or proximal tail pancreatic mass with duct 

dilatation and distal atrophy


States it was worked up at [facility] - reportedly benign 


Need details and records


If not , will need EUS for evaluation and diagnosis of pancreatic mass. 





will give trial of  thorazine for hiccups

## 2019-02-07 VITALS — SYSTOLIC BLOOD PRESSURE: 116 MMHG | TEMPERATURE: 98.4 F | HEART RATE: 96 BPM | DIASTOLIC BLOOD PRESSURE: 81 MMHG

## 2019-02-07 LAB
ALBUMIN SERPL-MCNC: 2.7 G/DL (ref 3.4–5)
ALP SERPL-CCNC: 66 U/L (ref 45–117)
ALT SERPL-CCNC: 12 U/L (ref 13–61)
ANION GAP SERPL CALC-SCNC: 7 MMOL/L (ref 8–16)
AST SERPL-CCNC: 12 U/L (ref 15–37)
BILIRUB SERPL-MCNC: 0.4 MG/DL (ref 0.2–1)
BUN SERPL-MCNC: 6 MG/DL (ref 7–18)
CALCIUM SERPL-MCNC: 8.1 MG/DL (ref 8.5–10.1)
CHLORIDE SERPL-SCNC: 106 MMOL/L (ref 98–107)
CO2 SERPL-SCNC: 25 MMOL/L (ref 21–32)
CREAT SERPL-MCNC: 1 MG/DL (ref 0.55–1.3)
DEPRECATED RDW RBC AUTO: 15.5 % (ref 11.9–15.9)
GLUCOSE SERPL-MCNC: 102 MG/DL (ref 74–106)
HCT VFR BLD CALC: 33.7 % (ref 35.4–49)
HGB BLD-MCNC: 11.6 GM/DL (ref 11.7–16.9)
MCH RBC QN AUTO: 28.8 PG (ref 25.7–33.7)
MCHC RBC AUTO-ENTMCNC: 34.3 G/DL (ref 32–35.9)
MCV RBC: 84.2 FL (ref 80–96)
PLATELET # BLD AUTO: 202 K/MM3 (ref 134–434)
PMV BLD: 8 FL (ref 7.5–11.1)
POTASSIUM SERPLBLD-SCNC: 4 MMOL/L (ref 3.5–5.1)
PROT SERPL-MCNC: 6.2 G/DL (ref 6.4–8.2)
RBC # BLD AUTO: 4.01 M/MM3 (ref 4–5.6)
SODIUM SERPL-SCNC: 138 MMOL/L (ref 136–145)
WBC # BLD AUTO: 8.7 K/MM3 (ref 4–10)

## 2019-02-07 RX ADMIN — KETOROLAC TROMETHAMINE SCH: 30 INJECTION INTRAMUSCULAR; INTRAVENOUS at 01:14

## 2019-02-07 RX ADMIN — KETOROLAC TROMETHAMINE SCH: 30 INJECTION INTRAMUSCULAR; INTRAVENOUS at 11:03

## 2019-02-07 NOTE — DS
Physical Examination


Vital Signs: 


 Vital Signs











Temperature  98.6 F   02/07/19 05:00


 


Pulse Rate  75   02/07/19 05:00


 


Respiratory Rate  18   02/07/19 05:00


 


Blood Pressure  130/67   02/07/19 05:00


 


O2 Sat by Pulse Oximetry (%)  98   02/04/19 18:39











Findings/Remarks: 





Rt lower quadrent pain better . Dr Herr oncology consult appreciated


Will discuss with Dr Johnson regarding lethotreptsy


Constitutional: Yes: No Distress


Eyes: Yes: WNL


HENT: Yes: WNL


Neck: Yes: WNL


Cardiovascular: Yes: WNL


Respiratory: Yes: WNL


Gastrointestinal: Yes: WNL


...Rectal Exam: Yes: Deferred


Renal/: Yes: WNL


Breast(s): Yes: WNL


Musculoskeletal: Yes: WNL


Edema: No


Peripheral Pulses WNL: Yes


Neurological: Yes: WNL, Alert


...Motor Strength: WNL


Psychiatric: Yes: WNL


Labs: 


 CBC, BMP





 02/07/19 07:00 





 02/07/19 07:00 











Discharge Summary


Reason For Visit: ACUTE KIDNY INJURY/COLITIS/CALCULUS OF KIDNEY/


Current Active Problems





HERNANDEZ (acute kidney injury) (Acute)


Colitis (Acute)


Dehydration (Acute)








Condition: Guarded





- Instructions


Referrals: 


Soco Lyons MD [Primary Care Provider] - 





- Home Medications


Comprehensive Discharge Medication List: 


Ambulatory Orders





Amlodipine Besylate [Norvasc -] 10 mg PO DAILY 01/10/19 


Diclofenac Potassium 50 mg PO ASDIR 01/10/19 


Fluticasone Furoate [Children's Flonase Sensimist] 5.9 ml NS ASDIR 01/10/19 


Loratadine 10 mg PO ASDIR 01/10/19 


Omeprazole 20 mg PO ASDIR 01/10/19 


Oxybutynin Chloride [Ditropan -] 5 mg PO DAILY 01/10/19 


Tamsulosin HCl [Flomax] 0.4 mg PO DAILY #7 cap.er.24h 01/10/19 


Vancomycin/0.9 % Sod Chloride [Vanco 1 Gram/250 ml-0.9% NaCl] 1 gm IV BID 9 

Days #18 plast..bag 01/24/19

## 2019-02-11 ENCOUNTER — HOSPITAL ENCOUNTER (OUTPATIENT)
Dept: HOSPITAL 74 - JASU-SURG | Age: 69
Discharge: HOME | End: 2019-02-11
Attending: UROLOGY
Payer: COMMERCIAL

## 2019-02-11 VITALS — TEMPERATURE: 98 F

## 2019-02-11 VITALS — BODY MASS INDEX: 22.7 KG/M2

## 2019-02-11 VITALS — HEART RATE: 78 BPM | DIASTOLIC BLOOD PRESSURE: 80 MMHG | SYSTOLIC BLOOD PRESSURE: 128 MMHG

## 2019-02-11 DIAGNOSIS — N20.0: Primary | ICD-10-CM

## 2019-02-11 PROCEDURE — 0TF3XZZ FRAGMENTATION IN RIGHT KIDNEY PELVIS, EXTERNAL APPROACH: ICD-10-PCS | Performed by: UROLOGY

## 2019-02-11 NOTE — OP
Operative Note





- Note:


Operative Date: 02/11/19


Pre-Operative Diagnosis: Right Renal stone


Operation: Right ESWL


Findings: 





15 mm mid pole Right renal stone


Post-Operative Diagnosis: Same as Pre-op


Surgeon: José Luis Lentz


Anesthesia: Fractional


Estimated Blood Loss (mls): 0


Operative Report Dictated: Yes

## 2019-02-12 NOTE — OP
DATE OF OPERATION:  02/11/2019

 

PREOPERATIVE DIAGNOSIS:  Right renal stone.

 

POSTOPERATIVE DIAGNOSIS:  Right renal stone.

 

PROCEDURE:  Right extracorporeal shockwave lithotripsy.

 

ATTENDING:  Abhishek Bahena MD

 

ANESTHESIA:  General.

 

OPERATION:  The patient was brought in the operating room, placed in the supine

position on the operating room table.   Ultrasonography and fluoroscopy were

performed.  A 15-mm right mid pole stone was identified.  The patient had a stent

placed previously.  Anesthesia and preoperative antibiotics were given to the

patient.  Shockwave lithotripsy was then performed.  The patient tolerated the

procedure very well.  There were no complications noted.  The disposition of the

patient was to the recovery room.

 

 

ABHISHEK BAHENA M.D. [id number]

DD: 02/11/2019 16:06

DT: 02/12/2019 09:24

Job #:  [id number]

## 2019-03-25 ENCOUNTER — HOSPITAL ENCOUNTER (OUTPATIENT)
Dept: HOSPITAL 74 - JASU-SURG | Age: 69
Discharge: HOME | End: 2019-03-25
Attending: UROLOGY
Payer: COMMERCIAL

## 2019-03-25 VITALS — BODY MASS INDEX: 23.7 KG/M2

## 2019-03-25 VITALS — TEMPERATURE: 98 F | SYSTOLIC BLOOD PRESSURE: 138 MMHG | DIASTOLIC BLOOD PRESSURE: 77 MMHG

## 2019-03-25 VITALS — HEART RATE: 67 BPM

## 2019-03-25 DIAGNOSIS — N20.0: Primary | ICD-10-CM

## 2019-03-25 PROCEDURE — 0TF4XZZ FRAGMENTATION IN LEFT KIDNEY PELVIS, EXTERNAL APPROACH: ICD-10-PCS | Performed by: UROLOGY

## 2019-03-25 NOTE — OP
Operative Note





- Note:


Operative Date: 03/25/19


Pre-Operative Diagnosis: Left renal stone


Operation: Left ESWL


Findings: 





6 mm mid pole left renal stone


Post-Operative Diagnosis: Same as Pre-op


Surgeon: José Luis Lentz


Anesthesia: Fractional


Estimated Blood Loss (mls): 0


Operative Report Dictated: Yes

## 2019-05-01 NOTE — OP
DATE OF OPERATION:  03/25/2019

 

PREOPERATIVE DIAGNOSIS:  Left renal stone.

 

POSTOPERATIVE DIAGNOSIS:  Left renal stone.

 

PROCEDURE:  Left extracorporeal shock wave lithotripsy.

 

ATTENDING SURGEON:  Abhishek Lentz MD

 

ANESTHESIA:  Fractional.

 

OPERATION:  As follows, the patient was brought into the operating room and placed in

a supine position on the operating room table.  Ultrasonography and fluoroscopy were

performed.  A 6-mm left mid pole stone was identified.  Patient was then given

anesthesia and preoperative antibiotics.  Shock wave lithotripsy was then performed. 

Excellent fragmentation of the 6-mm left mid pole stone was seen.  No complications

were noted.  The patient tolerated the procedure very well.

 

DISPOSITION:  The disposition of the patient was to the recovery room.

 

 

ABHISHEK BAHENA M.D. [id number]

DD: 05/01/2019 08:37

DT: 05/01/2019 09:57

Job #:  [id number]

## 2020-09-09 NOTE — PN
[facility] Progress Note, Physician


Chief Complaint: 





Rt lower abdominal pain is better


History of Present Illness: 





Diagnosed to have kidney stones ,has a stent Rt ureter


 On IV antibiotics,will discuss with Dr Jeronimo Zurita regarding lethotrepsy as out 

patient





- Current Medication List


Current Medications: 


Active Medications





Potassium Chloride/Dextrose/Sod Cl (D5-1/2ns+10 Meq Kcl -)  10 meq in 1,000 mls 

@ 83 mls/hr IV ASDIR NAVYA


   Last Admin: 02/05/19 23:23 Dose:  83 mls/hr


Ampicillin Sodium/Sulbactam (Sodium 1.5 gm/ Sodium Chloride)  100 mls @ 200 mls/

hr IVPB Q8H-IV NAVYA


Ampicillin Sodium/Sulbactam (Sodium 1.5 gm/ Sodium Chloride)  100 mls @ 200 mls/

hr IVPB Q8H-IV NAVYA


   Stop: 02/06/19 10:59


   Last Admin: 02/06/19 01:37 Dose:  200 mls/hr


Ketorolac Tromethamine (Toradol Injection -)  30 mg IVPUSH Q8H-IV NAVYA


   Stop: 02/10/19 02:29


   Last Admin: 02/06/19 01:36 Dose:  Not Given


Melatonin (Melatonin)  5 mg PO HS PRN


   PRN Reason: INSOMNIA


   Last Admin: 02/05/19 23:38 Dose:  5 mg


Oxycodone HCl (Roxicodone -)  5 mg PO Q4H PRN


   PRN Reason: PAIN LEVEL 4 - 6


   Last Admin: 02/06/19 05:45 Dose:  5 mg











- Objective


Vital Signs: 


 Vital Signs











Temperature  98.9 F   02/06/19 05:52


 


Pulse Rate  79   02/06/19 05:52


 


Respiratory Rate  18   02/06/19 05:52


 


Blood Pressure  127/67   02/06/19 05:52


 


O2 Sat by Pulse Oximetry (%)  98   02/04/19 18:39











Constitutional: Yes: No Distress


Eyes: Yes: WNL


HENT: Yes: WNL


Neck: Yes: WNL


Cardiovascular: Yes: WNL


Respiratory: Yes: WNL


Gastrointestinal: Yes: WNL


...Rectal Exam: Yes: Deferred


Genitourinary: Yes: CVA Tenderness - Right


Musculoskeletal: Yes: WNL


Peripheral Pulses WNL: Yes


Neurological: Yes: Alert


Labs: 


 CBC, BMP





 02/04/19 12:57 





 02/04/19 12:57 











Assessment/Plan





KEISHA Kennedy ,start Levaquin